# Patient Record
Sex: MALE | Race: OTHER | URBAN - METROPOLITAN AREA
[De-identification: names, ages, dates, MRNs, and addresses within clinical notes are randomized per-mention and may not be internally consistent; named-entity substitution may affect disease eponyms.]

---

## 2017-01-03 ENCOUNTER — IMPORTED ENCOUNTER (OUTPATIENT)
Dept: URBAN - METROPOLITAN AREA CLINIC 50 | Facility: CLINIC | Age: 77
End: 2017-01-03

## 2017-01-24 ENCOUNTER — IMPORTED ENCOUNTER (OUTPATIENT)
Dept: URBAN - METROPOLITAN AREA CLINIC 50 | Facility: CLINIC | Age: 77
End: 2017-01-24

## 2017-02-01 ENCOUNTER — IMPORTED ENCOUNTER (OUTPATIENT)
Dept: URBAN - METROPOLITAN AREA CLINIC 50 | Facility: CLINIC | Age: 77
End: 2017-02-01

## 2017-02-10 ENCOUNTER — IMPORTED ENCOUNTER (OUTPATIENT)
Dept: URBAN - METROPOLITAN AREA CLINIC 50 | Facility: CLINIC | Age: 77
End: 2017-02-10

## 2017-02-15 ENCOUNTER — IMPORTED ENCOUNTER (OUTPATIENT)
Dept: URBAN - METROPOLITAN AREA CLINIC 50 | Facility: CLINIC | Age: 77
End: 2017-02-15

## 2017-02-24 ENCOUNTER — IMPORTED ENCOUNTER (OUTPATIENT)
Dept: URBAN - METROPOLITAN AREA CLINIC 50 | Facility: CLINIC | Age: 77
End: 2017-02-24

## 2017-03-01 ENCOUNTER — IMPORTED ENCOUNTER (OUTPATIENT)
Dept: URBAN - METROPOLITAN AREA CLINIC 50 | Facility: CLINIC | Age: 77
End: 2017-03-01

## 2017-03-24 ENCOUNTER — IMPORTED ENCOUNTER (OUTPATIENT)
Dept: URBAN - METROPOLITAN AREA CLINIC 50 | Facility: CLINIC | Age: 77
End: 2017-03-24

## 2017-06-07 ENCOUNTER — IMPORTED ENCOUNTER (OUTPATIENT)
Dept: URBAN - METROPOLITAN AREA CLINIC 50 | Facility: CLINIC | Age: 77
End: 2017-06-07

## 2017-06-13 ENCOUNTER — IMPORTED ENCOUNTER (OUTPATIENT)
Dept: URBAN - METROPOLITAN AREA CLINIC 50 | Facility: CLINIC | Age: 77
End: 2017-06-13

## 2018-01-24 ENCOUNTER — IMPORTED ENCOUNTER (OUTPATIENT)
Dept: URBAN - METROPOLITAN AREA CLINIC 50 | Facility: CLINIC | Age: 78
End: 2018-01-24

## 2020-04-01 ENCOUNTER — IMPORTED ENCOUNTER (OUTPATIENT)
Dept: URBAN - METROPOLITAN AREA CLINIC 50 | Facility: CLINIC | Age: 80
End: 2020-04-01

## 2021-04-17 ASSESSMENT — TONOMETRY
OS_IOP_MMHG: 14
OD_IOP_MMHG: 28
OS_IOP_MMHG: 10
OS_IOP_MMHG: 18
OD_IOP_MMHG: 12
OD_IOP_MMHG: 13
OD_IOP_MMHG: 10
OS_IOP_MMHG: 14
OD_IOP_MMHG: 18
OD_IOP_MMHG: 14
OS_IOP_MMHG: 14
OS_IOP_MMHG: 13
OS_IOP_MMHG: 15
OD_IOP_MMHG: 14
OS_IOP_MMHG: 14
OD_IOP_MMHG: 16
OS_IOP_MMHG: 16
OD_IOP_MMHG: 13

## 2021-04-17 ASSESSMENT — VISUAL ACUITY
OS_CC: 20/30
OD_CC: J1+
OD_PH: 20/30-
OS_SC: 20/30
OD_BAT: 20/40
OD_SC: 20/40
OS_PH: 20/50
OD_OTHER: 20/50. 20/60.
OD_SC: 20/20
OS_OTHER: 20/60. 20/80.
OS_CC: J1+
OS_CC: 20/30-2
OS_SC: 20/40-
OS_CC: J1+@ 16 IN
OS_CC: 20/15
OD_SC: 20/25-
OD_BAT: 20/50
OS_PH: 20/30-
OD_BAT: 20/50
OS_BAT: 20/200
OS_OTHER: 20/40. 20/50.
OD_SC: 20/20
OS_BAT: 20/40
OD_CC: J1+@ 16 IN
OS_SC: 20/30-
OD_BAT: 20/30
OD_SC: 20/30-
OD_SC: 20/40-
OD_CC: 20/20
OS_BAT: 20/50
OD_OTHER: 20/30. 20/40.
OD_CC: 20/20-2
OS_SC: 20/40+2
OD_SC: 20/40
OD_PH: 20/50
OS_BAT: 20/60
OS_SC: 20/40
OD_OTHER: 20/40. 20/60.
OS_PH: 20/25
OS_OTHER: 20/200. 20/<400.
OS_SC: 20/25-1+2

## 2022-11-20 ENCOUNTER — NON-APPOINTMENT (OUTPATIENT)
Age: 82
End: 2022-11-20

## 2022-11-21 ENCOUNTER — TRANSCRIPTION ENCOUNTER (OUTPATIENT)
Age: 82
End: 2022-11-21

## 2022-11-23 ENCOUNTER — OUTPATIENT (OUTPATIENT)
Dept: OUTPATIENT SERVICES | Facility: HOSPITAL | Age: 82
LOS: 1 days | End: 2022-11-23

## 2022-11-23 ENCOUNTER — APPOINTMENT (OUTPATIENT)
Dept: DISASTER EMERGENCY | Facility: HOSPITAL | Age: 82
End: 2022-11-23

## 2022-11-23 VITALS
RESPIRATION RATE: 18 BRPM | OXYGEN SATURATION: 97 % | SYSTOLIC BLOOD PRESSURE: 133 MMHG | HEART RATE: 71 BPM | TEMPERATURE: 98 F | DIASTOLIC BLOOD PRESSURE: 74 MMHG

## 2022-11-23 VITALS
RESPIRATION RATE: 18 BRPM | OXYGEN SATURATION: 96 % | HEART RATE: 83 BPM | SYSTOLIC BLOOD PRESSURE: 144 MMHG | WEIGHT: 227.96 LBS | HEIGHT: 68 IN | TEMPERATURE: 98 F | DIASTOLIC BLOOD PRESSURE: 80 MMHG

## 2022-11-23 DIAGNOSIS — U07.1 COVID-19: ICD-10-CM

## 2022-11-23 RX ORDER — BEBTELOVIMAB 87.5 MG/ML
175 INJECTION, SOLUTION INTRAVENOUS ONCE
Refills: 0 | Status: COMPLETED | OUTPATIENT
Start: 2022-11-23 | End: 2022-11-23

## 2022-11-23 RX ADMIN — BEBTELOVIMAB 175 MILLIGRAM(S): 87.5 INJECTION, SOLUTION INTRAVENOUS at 08:47

## 2022-11-23 NOTE — MONOCLONAL ANTIBODY INFUSION - EXAM
CC: Monoclonal Antibody Infusion/COVID 19 Positive  82yMale    exam/findings:  T(C): 36.9 (11-23-22 @ 08:38), Max: 36.9 (11-23-22 @ 08:38)  HR: 83 (11-23-22 @ 08:38) (83 - 83)  BP: 144/80 (11-23-22 @ 08:38) (144/80 - 144/80)  RR: 18 (11-23-22 @ 08:38) (18 - 18)  SpO2: 96% (11-23-22 @ 08:38) (96% - 96%)      PE:   Appearance: NAD	  HEENT:   Normal oral mucosa,   Lymphatic: No lymphadenopathy  Cardiovascular: Normal S1 S2, No JVD, No murmurs, No edema  Respiratory: Lungs clear to auscultation	  Gastrointestinal:  Soft, Non-tender, + BS	  Skin: warm and dry  Neurologic: Non-focal  Extremities: Normal range of motion,

## 2022-11-23 NOTE — MONOCLONAL ANTIBODY INFUSION - ASSESSMENT AND PLAN
This is a 82 yr sold male with PMHx of HTN, HLD, coronary stents x 5 and recently diagnosed with Covid-19 infection who was referred for monoclonal antibody infusion by provider in urgent care after testing positive for COVID 19 on11/20/22.  Patient states he has been experiencing fever,  cough, sore throat, malaise, and nasal congestion since 11/20/22. He denies any CP, SOB, chills, numbness/tingling in b/l limbs, loss of sensation or motor function, N/V/D. Pt is vaccinated and boosted with pfizer.  PLAN:  - Injection procedure explained to patient   - Consent for monoclonal antibody injection obtained   - Risk & benefits discussed/all questions answered  - Inject Bebtelovimab 175 mg over 1 minute.  - Observe patient for one hour post administration    I have reviewed the Bebtelovimab Emergency Use Authorization (EUA) and I have provided the patient or patient's caregiver with the following information:    1. FDA has authorized emergency use Bebtelovimab, which is not an FDA-approved biological product.  2. The patient or patient's caregiver has the option to accept or refuse administration of Bebtelovimab.  3. The significant known and potential risks and benefits of Bebtelovimab and the extent to which such risks and benefits are unknown.  4. Information on available alternative treatments and risks and benefits of those alternatives.    The patient's COVID monoclonal antibody injection administration went well without any complications. The patient tolerated the treatment without any reactions. Vitals were stable throughout the injection & post-injection administration. The pt denies any CP, fevers, chills, SOB, numbness/tingling in b/l limbs, loss of sensation or motor function, N/V/D while receiving the injection. Patient denies any symptoms an hour after post injection. Vitals were taken post injection and were stable. Pt is medically stable to be discharged home. Discharge instructions were provided to the patient with a fact sheet included. Patient was instructed to self-isolate and use infection control measures (e.g wear mask, isolate, social distance, avoid sharing personal items, clean and disinfect "high touch" surfaces, and frequent handwashing according to the CDC guidelines. The patient was informed on what symptoms to be aware of for the next couple of days, and if there are any issues to call the 24/7 clinical call center. Patient was instructed to follow up with PCP as needed.

## 2023-08-02 ENCOUNTER — NON-APPOINTMENT (OUTPATIENT)
Age: 83
End: 2023-08-02

## 2023-11-21 ENCOUNTER — PREPPED CHART (OUTPATIENT)
Dept: URBAN - METROPOLITAN AREA CLINIC 49 | Facility: LOCATION | Age: 83
End: 2023-11-21

## 2023-12-07 ENCOUNTER — NEW PATIENT (OUTPATIENT)
Dept: URBAN - METROPOLITAN AREA CLINIC 49 | Facility: LOCATION | Age: 83
End: 2023-12-07

## 2023-12-07 ENCOUNTER — PREPPED CHART (OUTPATIENT)
Dept: URBAN - METROPOLITAN AREA CLINIC 49 | Facility: LOCATION | Age: 83
End: 2023-12-07

## 2023-12-07 VITALS — HEIGHT: 60 IN

## 2023-12-07 DIAGNOSIS — H35.373: ICD-10-CM

## 2023-12-07 DIAGNOSIS — H35.3131: ICD-10-CM

## 2023-12-07 DIAGNOSIS — H35.343: ICD-10-CM

## 2023-12-07 DIAGNOSIS — H26.493: ICD-10-CM

## 2023-12-07 DIAGNOSIS — H43.811: ICD-10-CM

## 2023-12-07 PROCEDURE — 92015 DETERMINE REFRACTIVE STATE: CPT

## 2023-12-07 PROCEDURE — 92134 CPTRZ OPH DX IMG PST SGM RTA: CPT

## 2023-12-07 PROCEDURE — 92004 COMPRE OPH EXAM NEW PT 1/>: CPT

## 2023-12-07 PROCEDURE — 66821 AFTER CATARACT LASER SURGERY: CPT

## 2023-12-07 ASSESSMENT — VISUAL ACUITY
OS_SC: 20/40
OD_PH: 20/60
OD_CC: 20/80-2
OS_CC: 20/40
OS_GLARE: 20/50
OS_GLARE: 20/40
OU_CC: J3-2
OD_SC: 20/60-2

## 2023-12-07 ASSESSMENT — TONOMETRY
OD_IOP_MMHG: 14
OS_IOP_MMHG: 15
OD_IOP_MMHG: 15

## 2024-02-08 ENCOUNTER — CLINIC PROCEDURE ONLY (OUTPATIENT)
Dept: URBAN - METROPOLITAN AREA CLINIC 49 | Facility: LOCATION | Age: 84
End: 2024-02-08

## 2024-02-08 DIAGNOSIS — H26.492: ICD-10-CM

## 2024-02-08 PROCEDURE — 6682150 YAG CAPSULOTOMY BILATERAL

## 2024-02-08 ASSESSMENT — VISUAL ACUITY
OD_CC: 20/70
OS_CC: 20/40

## 2024-02-08 ASSESSMENT — TONOMETRY
OD_IOP_MMHG: 15
OS_IOP_MMHG: 15
OD_IOP_MMHG: 08

## 2024-03-05 ENCOUNTER — POST-OP (OUTPATIENT)
Dept: URBAN - METROPOLITAN AREA CLINIC 53 | Facility: CLINIC | Age: 84
End: 2024-03-05

## 2024-03-05 DIAGNOSIS — Z98.890: ICD-10-CM

## 2024-03-05 DIAGNOSIS — H35.343: ICD-10-CM

## 2024-03-05 DIAGNOSIS — H26.491: ICD-10-CM

## 2024-03-05 DIAGNOSIS — H43.811: ICD-10-CM

## 2024-03-05 DIAGNOSIS — H35.373: ICD-10-CM

## 2024-03-05 DIAGNOSIS — H35.3131: ICD-10-CM

## 2024-03-05 PROCEDURE — 66821 AFTER CATARACT LASER SURGERY: CPT

## 2024-03-05 PROCEDURE — 92134 CPTRZ OPH DX IMG PST SGM RTA: CPT

## 2024-03-05 ASSESSMENT — VISUAL ACUITY
OU_CC: J7
OD_CC: 20/80-1
OS_CC: 20/80
OS_PH: 20/50
OU_CC: 20/60

## 2024-03-05 ASSESSMENT — TONOMETRY
OD_IOP_MMHG: 15
OD_IOP_MMHG: 14
OS_IOP_MMHG: 12

## 2024-03-19 ENCOUNTER — POST-OP (OUTPATIENT)
Dept: URBAN - METROPOLITAN AREA CLINIC 53 | Facility: CLINIC | Age: 84
End: 2024-03-19

## 2024-03-19 DIAGNOSIS — Z98.890: ICD-10-CM

## 2024-03-19 DIAGNOSIS — H35.343: ICD-10-CM

## 2024-03-19 DIAGNOSIS — H35.373: ICD-10-CM

## 2024-03-19 DIAGNOSIS — H35.3131: ICD-10-CM

## 2024-03-19 PROCEDURE — 92015 DETERMINE REFRACTIVE STATE: CPT

## 2024-03-19 ASSESSMENT — VISUAL ACUITY
OS_SC: 20/60
OU_SC: J5@16"
OS_PH: 20/50
OU_SC: 20/60
OD_SC: 20/70

## 2024-03-19 ASSESSMENT — TONOMETRY
OD_IOP_MMHG: 14
OS_IOP_MMHG: 12

## 2025-01-15 PROBLEM — Z00.00 ENCOUNTER FOR PREVENTIVE HEALTH EXAMINATION: Status: ACTIVE | Noted: 2025-01-15

## 2025-01-21 ENCOUNTER — INPATIENT (INPATIENT)
Facility: HOSPITAL | Age: 85
LOS: 2 days | Discharge: HOME | DRG: 293 | End: 2025-01-24
Attending: STUDENT IN AN ORGANIZED HEALTH CARE EDUCATION/TRAINING PROGRAM | Admitting: HOSPITALIST
Payer: MEDICARE

## 2025-01-21 ENCOUNTER — RESULT REVIEW (OUTPATIENT)
Age: 85
End: 2025-01-21

## 2025-01-21 VITALS
RESPIRATION RATE: 22 BRPM | HEART RATE: 92 BPM | HEIGHT: 70 IN | WEIGHT: 235.89 LBS | OXYGEN SATURATION: 88 % | TEMPERATURE: 98 F | DIASTOLIC BLOOD PRESSURE: 76 MMHG | SYSTOLIC BLOOD PRESSURE: 126 MMHG

## 2025-01-21 DIAGNOSIS — I50.9 HEART FAILURE, UNSPECIFIED: ICD-10-CM

## 2025-01-21 DIAGNOSIS — Z98.890 OTHER SPECIFIED POSTPROCEDURAL STATES: Chronic | ICD-10-CM

## 2025-01-21 DIAGNOSIS — I50.21 ACUTE SYSTOLIC (CONGESTIVE) HEART FAILURE: ICD-10-CM

## 2025-01-21 LAB
ALBUMIN SERPL ELPH-MCNC: 3.5 G/DL — SIGNIFICANT CHANGE UP (ref 3.3–5.2)
ALP SERPL-CCNC: 59 U/L — SIGNIFICANT CHANGE UP (ref 40–120)
ALT FLD-CCNC: 15 U/L — SIGNIFICANT CHANGE UP
ANION GAP SERPL CALC-SCNC: 13 MMOL/L — SIGNIFICANT CHANGE UP (ref 5–17)
ANISOCYTOSIS BLD QL: SLIGHT — SIGNIFICANT CHANGE UP
APTT BLD: 36.3 SEC — HIGH (ref 24.5–35.6)
AST SERPL-CCNC: 17 U/L — SIGNIFICANT CHANGE UP
BASOPHILS # BLD AUTO: 0 K/UL — SIGNIFICANT CHANGE UP (ref 0–0.2)
BASOPHILS NFR BLD AUTO: 0 % — SIGNIFICANT CHANGE UP (ref 0–2)
BILIRUB SERPL-MCNC: 0.7 MG/DL — SIGNIFICANT CHANGE UP (ref 0.4–2)
BUN SERPL-MCNC: 27.2 MG/DL — HIGH (ref 8–20)
CALCIUM SERPL-MCNC: 8.3 MG/DL — LOW (ref 8.4–10.5)
CHLORIDE SERPL-SCNC: 107 MMOL/L — SIGNIFICANT CHANGE UP (ref 96–108)
CO2 SERPL-SCNC: 22 MMOL/L — SIGNIFICANT CHANGE UP (ref 22–29)
CREAT SERPL-MCNC: 0.9 MG/DL — SIGNIFICANT CHANGE UP (ref 0.5–1.3)
EGFR: 85 ML/MIN/1.73M2 — SIGNIFICANT CHANGE UP
EOSINOPHIL # BLD AUTO: 0.06 K/UL — SIGNIFICANT CHANGE UP (ref 0–0.5)
EOSINOPHIL NFR BLD AUTO: 0.9 % — SIGNIFICANT CHANGE UP (ref 0–6)
FLUAV AG NPH QL: SIGNIFICANT CHANGE UP
FLUBV AG NPH QL: SIGNIFICANT CHANGE UP
GLUCOSE SERPL-MCNC: 117 MG/DL — HIGH (ref 70–99)
HCT VFR BLD CALC: 37.2 % — LOW (ref 39–50)
HGB BLD-MCNC: 12.1 G/DL — LOW (ref 13–17)
INR BLD: 1.64 RATIO — HIGH (ref 0.85–1.16)
LYMPHOCYTES # BLD AUTO: 0.32 K/UL — LOW (ref 1–3.3)
LYMPHOCYTES # BLD AUTO: 5.2 % — LOW (ref 13–44)
MANUAL SMEAR VERIFICATION: SIGNIFICANT CHANGE UP
MCHC RBC-ENTMCNC: 30.5 PG — SIGNIFICANT CHANGE UP (ref 27–34)
MCHC RBC-ENTMCNC: 32.5 G/DL — SIGNIFICANT CHANGE UP (ref 32–36)
MCV RBC AUTO: 93.7 FL — SIGNIFICANT CHANGE UP (ref 80–100)
MICROCYTES BLD QL: SLIGHT — SIGNIFICANT CHANGE UP
MONOCYTES # BLD AUTO: 0.54 K/UL — SIGNIFICANT CHANGE UP (ref 0–0.9)
MONOCYTES NFR BLD AUTO: 8.7 % — SIGNIFICANT CHANGE UP (ref 2–14)
NEUTROPHILS # BLD AUTO: 5.04 K/UL — SIGNIFICANT CHANGE UP (ref 1.8–7.4)
NEUTROPHILS NFR BLD AUTO: 80.8 % — HIGH (ref 43–77)
NEUTS BAND # BLD: 0.9 % — SIGNIFICANT CHANGE UP (ref 0–8)
NEUTS BAND NFR BLD: 0.9 % — SIGNIFICANT CHANGE UP (ref 0–8)
NT-PROBNP SERPL-SCNC: 2306 PG/ML — HIGH (ref 0–300)
OVALOCYTES BLD QL SMEAR: SLIGHT — SIGNIFICANT CHANGE UP
PLAT MORPH BLD: NORMAL — SIGNIFICANT CHANGE UP
PLATELET # BLD AUTO: SIGNIFICANT CHANGE UP K/UL (ref 150–400)
POIKILOCYTOSIS BLD QL AUTO: SLIGHT — SIGNIFICANT CHANGE UP
POLYCHROMASIA BLD QL SMEAR: SLIGHT — SIGNIFICANT CHANGE UP
POTASSIUM SERPL-MCNC: 3.9 MMOL/L — SIGNIFICANT CHANGE UP (ref 3.5–5.3)
POTASSIUM SERPL-SCNC: 3.9 MMOL/L — SIGNIFICANT CHANGE UP (ref 3.5–5.3)
PROT SERPL-MCNC: 6.3 G/DL — LOW (ref 6.6–8.7)
PROTHROM AB SERPL-ACNC: 18.9 SEC — HIGH (ref 9.9–13.4)
RBC # BLD: 3.97 M/UL — LOW (ref 4.2–5.8)
RBC # FLD: 15.6 % — HIGH (ref 10.3–14.5)
RBC BLD AUTO: ABNORMAL
RSV RNA NPH QL NAA+NON-PROBE: SIGNIFICANT CHANGE UP
SARS-COV-2 RNA SPEC QL NAA+PROBE: SIGNIFICANT CHANGE UP
SODIUM SERPL-SCNC: 142 MMOL/L — SIGNIFICANT CHANGE UP (ref 135–145)
TROPONIN T, HIGH SENSITIVITY RESULT: 41 NG/L — SIGNIFICANT CHANGE UP (ref 0–51)
VARIANT LYMPHS # BLD: 3.5 % — SIGNIFICANT CHANGE UP (ref 0–6)
VARIANT LYMPHS NFR BLD MANUAL: 3.5 % — SIGNIFICANT CHANGE UP (ref 0–6)
WBC # BLD: 6.17 K/UL — SIGNIFICANT CHANGE UP (ref 3.8–10.5)
WBC # FLD AUTO: 6.17 K/UL — SIGNIFICANT CHANGE UP (ref 3.8–10.5)

## 2025-01-21 PROCEDURE — 71045 X-RAY EXAM CHEST 1 VIEW: CPT | Mod: 26

## 2025-01-21 PROCEDURE — 71275 CT ANGIOGRAPHY CHEST: CPT | Mod: 26

## 2025-01-21 PROCEDURE — 99223 1ST HOSP IP/OBS HIGH 75: CPT

## 2025-01-21 PROCEDURE — 93306 TTE W/DOPPLER COMPLETE: CPT | Mod: 26

## 2025-01-21 PROCEDURE — 93970 EXTREMITY STUDY: CPT | Mod: 26

## 2025-01-21 PROCEDURE — 99285 EMERGENCY DEPT VISIT HI MDM: CPT | Mod: GC

## 2025-01-21 PROCEDURE — 99223 1ST HOSP IP/OBS HIGH 75: CPT | Mod: 25

## 2025-01-21 RX ORDER — ATORVASTATIN CALCIUM 80 MG/1
1 TABLET, FILM COATED ORAL
Refills: 0 | DISCHARGE

## 2025-01-21 RX ORDER — ENOXAPARIN SODIUM 100 MG/ML
100 INJECTION SUBCUTANEOUS EVERY 12 HOURS
Refills: 0 | Status: DISCONTINUED | OUTPATIENT
Start: 2025-01-21 | End: 2025-01-22

## 2025-01-21 RX ORDER — CARVEDILOL 6.25 MG
1 TABLET ORAL
Refills: 0 | DISCHARGE

## 2025-01-21 RX ORDER — ZOLPIDEM TARTRATE 5 MG/1
5 TABLET, COATED ORAL AT BEDTIME
Refills: 0 | Status: DISCONTINUED | OUTPATIENT
Start: 2025-01-21 | End: 2025-01-24

## 2025-01-21 RX ORDER — LEUPROLIDE ACETATE 1 MG/0.2ML
45 KIT SUBCUTANEOUS
Refills: 0 | DISCHARGE

## 2025-01-21 RX ORDER — CARVEDILOL 6.25 MG
25 TABLET ORAL EVERY 12 HOURS
Refills: 0 | Status: DISCONTINUED | OUTPATIENT
Start: 2025-01-21 | End: 2025-01-24

## 2025-01-21 RX ORDER — ZOLPIDEM TARTRATE 5 MG/1
1 TABLET, COATED ORAL
Refills: 0 | DISCHARGE

## 2025-01-21 RX ORDER — VALSARTAN 80 MG
1 TABLET ORAL
Refills: 0 | DISCHARGE

## 2025-01-21 RX ORDER — AMIODARONE HYDROCHLORIDE 50 MG/ML
200 INJECTION, SOLUTION INTRAVENOUS DAILY
Refills: 0 | Status: DISCONTINUED | OUTPATIENT
Start: 2025-01-21 | End: 2025-01-24

## 2025-01-21 RX ORDER — VALSARTAN 80 MG
320 TABLET ORAL DAILY
Refills: 0 | Status: DISCONTINUED | OUTPATIENT
Start: 2025-01-21 | End: 2025-01-24

## 2025-01-21 RX ORDER — HYDRALAZINE HCL 100 MG
50 TABLET ORAL
Refills: 0 | Status: DISCONTINUED | OUTPATIENT
Start: 2025-01-21 | End: 2025-01-24

## 2025-01-21 RX ORDER — IRON/FOLIC ACID/C/B6/B12/ZINC 150-1.25MG
1 TABLET ORAL
Refills: 0 | DISCHARGE

## 2025-01-21 RX ORDER — ASPIRIN 81 MG/1
0 TABLET, COATED ORAL
Refills: 0 | DISCHARGE

## 2025-01-21 RX ORDER — ASPIRIN 81 MG/1
81 TABLET, COATED ORAL DAILY
Refills: 0 | Status: DISCONTINUED | OUTPATIENT
Start: 2025-01-21 | End: 2025-01-24

## 2025-01-21 RX ORDER — AMIODARONE HYDROCHLORIDE 50 MG/ML
1 INJECTION, SOLUTION INTRAVENOUS
Refills: 0 | DISCHARGE

## 2025-01-21 RX ORDER — ATORVASTATIN CALCIUM 80 MG/1
40 TABLET, FILM COATED ORAL AT BEDTIME
Refills: 0 | Status: DISCONTINUED | OUTPATIENT
Start: 2025-01-21 | End: 2025-01-24

## 2025-01-21 RX ORDER — HYDRALAZINE HCL 100 MG
1 TABLET ORAL
Refills: 0 | DISCHARGE

## 2025-01-21 RX ADMIN — Medication 40 MILLIGRAM(S): at 13:57

## 2025-01-21 RX ADMIN — Medication 50 MILLIGRAM(S): at 20:40

## 2025-01-21 RX ADMIN — Medication 40 MILLIGRAM(S): at 20:40

## 2025-01-21 RX ADMIN — ENOXAPARIN SODIUM 100 MILLIGRAM(S): 100 INJECTION SUBCUTANEOUS at 20:40

## 2025-01-21 RX ADMIN — ATORVASTATIN CALCIUM 40 MILLIGRAM(S): 80 TABLET, FILM COATED ORAL at 22:00

## 2025-01-21 RX ADMIN — Medication 25 MILLIGRAM(S): at 20:40

## 2025-01-21 NOTE — CONSULT NOTE ADULT - PROBLEM SELECTOR RECOMMENDATION 9
- Pt w/ history of CAD s/p stents, severe MR s/p mitral clip, new onset Afib recently failed cardioversion now on amio/eliquis and developed heart failure symptoms at home  - will call and get records faxed from Dr. Boo's office in [place], according to cas pt had normal EF in december   - now pt w/ JVD, leg edema bilaterally, pulmonary edema, and bilateral pleural effusions  - CCTA negative for PE  - check echo   - switch eliquis to heparin infusion full AC protocol   - start lasix 40mg IV BID  - goal urine output is 1-2L net negative in 24h   - we will give GDMT based upon outpatient records and echo results   - admit to hospitalist  - potential ischemic evaluation  - we will follow

## 2025-01-21 NOTE — CONSULT NOTE ADULT - SUBJECTIVE AND OBJECTIVE BOX
[facility]                                              CARDIOLOGY AT [address], [place] [address]                                             Telephone: [phone]. Fax:[phone]                                                       CARDIOLOGY CONSULTATION NOTE                                                                                             History obtained by: Patient and medical record   Community Cardiologist: Dr. Alexandre Boo ([facility])    obtained: Yes [  ] No [  ]  Available out pt records reviewed: Yes [  ] No [  ]    Chief complaint:    Patient is a 84y old  Male who presents with a chief complaint of heart failure     HPI:  85 y/o Male w/ PMH of HTN, HLF, HF, Afib (on Eliquis) presenting w/ SOB for 1 week. Reports SOB, worse w/ exertion, LE edema, and palpitations. Endorses feeling lightheaded. States that he had a cardioversion in [place] on 1/13, sxs have not improved. Denies fever, chills, N/V/D, CP.    Review of symptoms:   Cardiac:  No chest pain. No dyspnea. No palpitations.  Respiratory: no cough. No dyspnea  Gastrointestinal: No diarrhea. No abdominal pain. No bleeding.   Neuro: No focal neuro complaints.  All other ROS negative unless otherwise listed above    PHYSICAL EXAM:  Appearance: Comfortable. No acute distress  HEENT:  Atraumatic. Normocephalic.  Normal oral mucosa  Neurologic: A & O x 3, no gross focal deficits.  Cardiovascular: RRR S1 S2, No murmur, no rubs/gallops. No JVD  Respiratory: Lungs clear to auscultation, unlabored   Gastrointestinal:  Soft, Non-tender, + BS  Lower Extremities: 2+ Peripheral Pulses, No clubbing, cyanosis, or edema  Psychiatry: Patient is calm. No agitation.   Skin: warm and dry.    PAST MEDICAL HISTORY  Atrial fibrillation    PAST SURGICAL HISTORY    SUBSTANCE USE HISTORY  Denies current and previous substance use [  ]   CIGARETTES -   ALCOHOL -   DRUGS -     FAMILY HISTORY:    CARDIAC SPECIFIC FAMILY HX   No KNOWN family history of Cardiovascular disease, CAD, or sudden death in first degree relatives unless specified below  Family History of Cardiovascular Disease:  [  ]   Coronary Artery Disease in first degree relative:  [  ]   Sudden Cardiac Death in First degree relative: [  ]    HOME MEDICATIONS:  amiodarone 200 mg oral tablet: 1 tab(s) orally once a day (21 Jan 2025 11:27)  amLODIPine 5 mg oral tablet: 1 tab(s) orally once a day (21 Jan 2025 11:27)  apixaban 5 mg oral tablet: 1 tab(s) orally once a day (21 Jan 2025 11:27)  aspirin 81 mg oral tablet: orally once a day (21 Jan 2025 11:27)  atorvastatin 40 mg oral tablet: 1 tab(s) orally once a day (21 Jan 2025 11:27)  carvedilol 25 mg oral tablet: 1 tab(s) orally 2 times a day (21 Jan 2025 11:28)  Eligard 45 mg/6 months subcutaneous injection, extended release: 45 milligram(s) subcutaneously every 3 months (21 Jan 2025 11:28)  finasteride 5 mg oral tablet: 1 tab(s) orally once a day (21 Jan 2025 11:28)  furosemide 40 mg oral tablet: 1 tab(s) orally once a day (21 Jan 2025 11:28)  hydrALAZINE 50 mg oral tablet: 1 tab(s) orally 2 times a day (21 Jan 2025 11:28)  PreserVision AREDS 2 oral capsule: 1 tab(s) orally 2 times a day (21 Jan 2025 11:28)  valsartan 320 mg oral tablet: 1 tab(s) orally once a day (21 Jan 2025 11:28)  zolpidem 5 mg oral tablet: 1 tab(s) orally once a day (at bedtime) (21 Jan 2025 11:28)    CURRENT CARDIAC MEDICATIONS:    CURRENT OTHER MEDICATIONS:    ALLERGIES:   No Known Allergies    VITAL SIGNS:  T(C): 36.7 (01-21-25 @ 09:56), Max: 36.7 (01-21-25 @ 09:56)  T(F): 98 (01-21-25 @ 09:56), Max: 98 (01-21-25 @ 09:56)  HR: 92 (01-21-25 @ 09:56) (92 - 92)  BP: 126/76 (01-21-25 @ 09:56) (126/76 - 126/76)  RR: 22 (01-21-25 @ 09:56) (22 - 22)  SpO2: 88% (01-21-25 @ 09:56) (88% - 88%)    INTAKE AND OUTPUT:     LABS:             12.1   6.17  )-----------( Clumped    ( 21 Jan 2025 11:20 )             37.2     01-21    142  |  107  |  27.2[H]  ----------------------------<  117[H]  3.9   |  22.0  |  0.90    Ca    8.3[L]      21 Jan 2025 11:20    TPro  6.3[L]  /  Alb  3.5  /  TBili  0.7  /  DBili  x   /  AST  17  /  ALT  15  /  AlkPhos  59  01-21    PT/INR - ( 21 Jan 2025 11:20 )   PT: 18.9 sec;   INR: 1.64 ratio         PTT - ( 21 Jan 2025 11:20 )  PTT:36.3 sec  Urinalysis Basic - ( 21 Jan 2025 11:20 )    Color: x / Appearance: x / SG: x / pH: x  Gluc: 117 mg/dL / Ketone: x  / Bili: x / Urobili: x   Blood: x / Protein: x / Nitrite: x   Leuk Esterase: x / RBC: x / WBC x   Sq Epi: x / Non Sq Epi: x / Bacteria: x    RADIOLOGY IMAGING:   CT Angio Chest PE Protocol w/ IV Cont: Urgent   Indication: SOB  Transport: Stretcher-Crib  Exam Completed (01-21-25 @ 11:05) [Results Available]  Xray Chest 1 View- PORTABLE-Urgent: Urgent   Indication: SOB  Transport: Portable  Exam Completed (01-21-25 @ 11:04) [Performed]  12 Lead ECG:   Provider's Contact #: [phone] (01-21-25 @ 10:01) [Completed]

## 2025-01-21 NOTE — H&P ADULT - NSICDXPASTMEDICALHX_GEN_ALL_CORE_FT
PAST MEDICAL HISTORY:  Atrial fibrillation     BPH without urinary obstruction     CAD (coronary artery disease)     HTN (hypertension)     Mitral regurgitation

## 2025-01-21 NOTE — H&P ADULT - TIME BILLING
Discussion with Emergency Department physician, Review of patient current and prior charts (including personal review of labs, imaging, EKG), Review on medications on Dr First, Patient interview and examination, discussion of current problems and plans, medication reconciliation, order placement, discussion with specialists and documentation of entire process.

## 2025-01-21 NOTE — ED PROVIDER NOTE - CARE PLAN
1 Principal Discharge DX:	Acute exacerbation of CHF (congestive heart failure)   Principal Discharge DX:	Acute exacerbation of CHF (congestive heart failure)  Secondary Diagnosis:	Acute hypoxic respiratory failure

## 2025-01-21 NOTE — ED ADULT NURSE NOTE - NSFALLHARMRISKINTERV_ED_ALL_ED
Communicate risk of Fall with Harm to all staff, patient, and family/Provide visual cue: red socks, yellow wristband, yellow gown, etc/Reinforce activity limits and safety measures with patient and family/Bed in lowest position, wheels locked, appropriate side rails in place/Call bell, personal items and telephone in reach/Instruct patient to call for assistance before getting out of bed/chair/stretcher/Non-slip footwear applied when patient is off stretcher/[place] to call system/Physically safe environment - no spills, clutter or unnecessary equipment/Purposeful Proactive Rounding/Room/bathroom lighting operational, light cord in reach Assistance OOB with selected safe patient handling equipment if applicable/Assistance with ambulation/Communicate risk of Fall with Harm to all staff, patient, and family/Monitor gait and stability/Provide patient with walking aids/Provide visual cue: red socks, yellow wristband, yellow gown, etc/Reinforce activity limits and safety measures with patient and family/Toileting schedule using arm’s reach rule for commode and bathroom/Bed in lowest position, wheels locked, appropriate side rails in place/Call bell, personal items and telephone in reach/Instruct patient to call for assistance before getting out of bed/chair/stretcher/Non-slip footwear applied when patient is off stretcher/[place] to call system/Physically safe environment - no spills, clutter or unnecessary equipment/Purposeful Proactive Rounding/Room/bathroom lighting operational, light cord in reach

## 2025-01-21 NOTE — H&P ADULT - NSHPPHYSICALEXAM_GEN_ALL_CORE
OBJECTIVE:  Vital Signs Last 24 Hrs  T(C): 36.2 (21 Jan 2025 15:44), Max: 36.7 (21 Jan 2025 09:56)  T(F): 97.2 (21 Jan 2025 15:44), Max: 98 (21 Jan 2025 09:56)  HR: 86 (21 Jan 2025 15:44) (86 - 92)  BP: 122/74 (21 Jan 2025 15:44) (122/74 - 126/76)  BP(mean): --  RR: 20 (21 Jan 2025 15:44) (20 - 22)  SpO2: 96% (21 Jan 2025 15:44) (88% - 96%)    Parameters below as of 21 Jan 2025 15:44  Patient On (Oxygen Delivery Method): nasal cannula        PHYSICAL EXAMINATION  General: Elderly male sitting up on stretcher, comfortable  HEENT:  2LNC  NECK:  Supple  CVS: JVD(+), Irregularly Irregular S1 S2, Murmur, Edema (+)  RESP:  Decrease breath sounds at bases  GI:  Soft nondistended nontender BS+  : No suprapubic tenderness  MSK:  Moves all extremities. Bilateral LE edema  CNS:  Awake, oriented, intact cognition and fluent speech  INTEG:  Warm skin  PSYCH:  Fair mood

## 2025-01-21 NOTE — H&P ADULT - NSHPLABSRESULTS_GEN_ALL_CORE
EKG - personally reviewed AF 85 bpm  CTA - Bilateral effusion, pulmonary edema, compressive atelectasis

## 2025-01-21 NOTE — PATIENT PROFILE ADULT - FALL HARM RISK - RISK INTERVENTIONS

## 2025-01-21 NOTE — ED PROVIDER NOTE - PHYSICAL EXAMINATION
General: Alert, awake, NAD  HEENT: NCAT. PERRLA  Neck: Supple, no JVD  Pulm: CTAB. No wheezing, rales, or rhonchi  Cardiac: RRR. No MRG  Abdomen: Soft, Nontender and nondistended. No rebound or guarding  Skin: Warm, dry, no rashes or lesions  Neuro: AAOx3, no gross focal deficits  MSK: No deformities, normal tone. HAN spontaneously  Extremities: No clubbing or cyanosis. 2+ pitting edema B/L. No calf tenderness B/L

## 2025-01-21 NOTE — ED PROVIDER NOTE - ATTENDING CONTRIBUTION TO CARE
pleasant adult male with increased SOB and edema s/p failed outpt DCCV for afib; on exam, rate controlled afib; warm and well perfused; mildly dyspneic; diminished BS b/l based; abd soft nt nd; +b/l pedal edema, pitting; labs and iamging reviewed c/w acute chf, pleural effusions; requiring O2 to maintain sats >92%; cards consult appreciated; plan for admission for management of chf    I personally saw the patient with the resident, and completed the key components of the history and physical exam. I then discussed the management plan with the resident.

## 2025-01-21 NOTE — H&P ADULT - HISTORY OF PRESENT ILLNESS
84 year old male with Hypertension, CAD s/p PCI, Atrial Fibrillation with recent unsuccessful cardioversion (Jan 13 in [place]), BPH, Mitral regurgitation s/p Clip and heart failure presents with dyspnea which has been ongoing for the past couple of weeks, worse on exertion and associated with chest tightness, palpitations, dizziness and intermittent pedal edema. Progressively worsening so much that he couldn't walk so came to ER.

## 2025-01-21 NOTE — ED PROVIDER NOTE - CLINICAL SUMMARY MEDICAL DECISION MAKING FREE TEXT BOX
85 y/o Male w/ PMH of HTN, HF, HLD, and A fib on Eliquis presenting with SOB for 1 week.    BNP 2306. CTA chest negative for PE, but notable for pulmonary edema and moderate pleural effusions. 40mg of Lasix IV given. Cardiology consulted.    Admit to medicine for further management.

## 2025-01-21 NOTE — ED ADULT TRIAGE NOTE - CHIEF COMPLAINT QUOTE
Pt had a cardioversion done for Afib in [place] on 1/13/24. Symptoms have not improved. Pt c/o SOB, dyspnea on exertion, lower extremity swelling and intermittent palpitations. Pt is on Eliquis and Metoprolol

## 2025-01-21 NOTE — H&P ADULT - ASSESSMENT
84 year old male with Hypertension, CAD s/p PCI, Atrial Fibrillation with recent unsuccessful cardioversion (Jan 13 in [place]), BPH, Mitral regurgitation s/p Clip and heart failure presents with dyspnea which has been ongoing for the past couple of weeks, worse on exertion and associated with chest tightness, palpitations, dizziness and intermittent pedal edema. Progressively worsening so much that he couldn't walk so came to ER.  Hypoxic in ER requiring supplemental O2 with EKG showing rate controlled Atrial Fibrillation CTA chest with pulmonary edema, bilateral effusions and compressive atelectasis      # Acute Heart Failure, Systolic + Diastolic  # Valvular Heart Disease  # Acute Hypoxic Respiratory Failure  # Atelectasis  # Atria Fibrillation  # History of CAD s/p PCI  # Hypertension    - Admit to telemetry  - Supplemental O2, wean as tolerated  - Diuresis with Furosemide 40mg IVq12; Fluid  and salt restriction, daily weight and close monitoring of fluid balance  - Resume home Valsartan, Carvedilol, Hydralazine for CHF  - Amiodarone for rate control  - Transition Apixaban to LMWH for now pending possible procedures  - ASA, Statin  - Check TTE  - Cardiology evaluation appreciated    Disposition - Pending clinical course;     Discussed with patient and daughter at bedside

## 2025-01-21 NOTE — ED PROVIDER NOTE - OBJECTIVE STATEMENT
85 y/o Male w/ PMH of Afib (on Eliquis) presenting w/ SOB for 1 week. Reports SOB, worse w/ exertion, LE edema, and palpitations. Endorses feeling lightheaded. States that he had a cardioversion in [place] on 1/13, sxs have not improved. Denies fever, chills, N/V/D, CP. 83 y/o Male w/ PMH of HTN, HLF, HF, Afib (on Eliquis) presenting w/ SOB for 1 week. Reports SOB, worse w/ exertion, LE edema, and palpitations. Endorses feeling lightheaded. States that he had a cardioversion in [place] on 1/13, sxs have not improved. Denies fever, chills, N/V/D, CP. 85 y/o Male w/ PMH of HTN, HLD, HF, Afib (on Eliquis) presenting w/ SOB for 1 week. Reports SOB, worse w/ exertion, LE edema, and palpitations. Endorses feeling lightheaded. States that he had a cardioversion in [place] on 1/13, sxs have not improved. Denies fever, chills, N/V/D, CP.

## 2025-01-21 NOTE — ED ADULT NURSE NOTE - OBJECTIVE STATEMENT
Received pt AOx4 with c/o SOB, palpitations x2days. Hx afib. +elaquis. Denies chest pain at this moment. On 4LNC. Denies Received pt AOx4 with c/o SOB, palpitations x2days. Hx afib. +elaquis. Denies chest pain at this moment. On 4LNC. Denies o2 use at home. Respirations even & unlabored. On cardiac monitor with no distress.  Safety maintained.

## 2025-01-21 NOTE — CONSULT NOTE ADULT - ASSESSMENT
83 y/o Male w/ PMH of HTN, HLF, HF, Afib (on Eliquis) presenting w/ SOB for 1 week. Reports SOB, worse w/ exertion, LE edema, and palpitations. Endorses feeling lightheaded. States that he had a cardioversion in [place] on 1/13, sxs have not improved. Denies fever, chills, N/V/D, CP.

## 2025-01-22 DIAGNOSIS — I50.33 ACUTE ON CHRONIC DIASTOLIC (CONGESTIVE) HEART FAILURE: ICD-10-CM

## 2025-01-22 DIAGNOSIS — I48.91 UNSPECIFIED ATRIAL FIBRILLATION: ICD-10-CM

## 2025-01-22 LAB
ANION GAP SERPL CALC-SCNC: 14 MMOL/L — SIGNIFICANT CHANGE UP (ref 5–17)
BUN SERPL-MCNC: 32.9 MG/DL — HIGH (ref 8–20)
CALCIUM SERPL-MCNC: 8.7 MG/DL — SIGNIFICANT CHANGE UP (ref 8.4–10.5)
CHLORIDE SERPL-SCNC: 103 MMOL/L — SIGNIFICANT CHANGE UP (ref 96–108)
CO2 SERPL-SCNC: 26 MMOL/L — SIGNIFICANT CHANGE UP (ref 22–29)
CREAT SERPL-MCNC: 1.25 MG/DL — SIGNIFICANT CHANGE UP (ref 0.5–1.3)
CRP SERPL-MCNC: 4 MG/L — SIGNIFICANT CHANGE UP
EGFR: 57 ML/MIN/1.73M2 — LOW
GLUCOSE SERPL-MCNC: 131 MG/DL — HIGH (ref 70–99)
POTASSIUM SERPL-MCNC: 3.3 MMOL/L — LOW (ref 3.5–5.3)
POTASSIUM SERPL-SCNC: 3.3 MMOL/L — LOW (ref 3.5–5.3)
SODIUM SERPL-SCNC: 143 MMOL/L — SIGNIFICANT CHANGE UP (ref 135–145)
TROPONIN T, HIGH SENSITIVITY RESULT: 60 NG/L — HIGH (ref 0–51)

## 2025-01-22 PROCEDURE — 99233 SBSQ HOSP IP/OBS HIGH 50: CPT | Mod: 25

## 2025-01-22 PROCEDURE — 99232 SBSQ HOSP IP/OBS MODERATE 35: CPT

## 2025-01-22 PROCEDURE — 99233 SBSQ HOSP IP/OBS HIGH 50: CPT

## 2025-01-22 RX ORDER — COLCHICINE 0.6 MG/1
0.6 TABLET ORAL
Refills: 0 | Status: DISCONTINUED | OUTPATIENT
Start: 2025-01-22 | End: 2025-01-24

## 2025-01-22 RX ORDER — POTASSIUM CHLORIDE 750 MG/1
40 TABLET, EXTENDED RELEASE ORAL ONCE
Refills: 0 | Status: COMPLETED | OUTPATIENT
Start: 2025-01-22 | End: 2025-01-22

## 2025-01-22 RX ORDER — APIXABAN 5 MG/1
5 TABLET, FILM COATED ORAL EVERY 12 HOURS
Refills: 0 | Status: DISCONTINUED | OUTPATIENT
Start: 2025-01-22 | End: 2025-01-24

## 2025-01-22 RX ORDER — COLCHICINE 0.6 MG/1
0.6 TABLET ORAL THREE TIMES A DAY
Refills: 0 | Status: DISCONTINUED | OUTPATIENT
Start: 2025-01-22 | End: 2025-01-22

## 2025-01-22 RX ORDER — MIDODRINE HYDROCHLORIDE 5 MG/1
10 TABLET ORAL ONCE
Refills: 0 | Status: COMPLETED | OUTPATIENT
Start: 2025-01-22 | End: 2025-01-22

## 2025-01-22 RX ADMIN — POTASSIUM CHLORIDE 40 MILLIEQUIVALENT(S): 750 TABLET, EXTENDED RELEASE ORAL at 13:20

## 2025-01-22 RX ADMIN — POTASSIUM CHLORIDE 40 MILLIEQUIVALENT(S): 750 TABLET, EXTENDED RELEASE ORAL at 13:02

## 2025-01-22 RX ADMIN — Medication 50 MILLIGRAM(S): at 05:52

## 2025-01-22 RX ADMIN — COLCHICINE 0.6 MILLIGRAM(S): 0.6 TABLET ORAL at 21:42

## 2025-01-22 RX ADMIN — Medication 25 MILLIGRAM(S): at 05:52

## 2025-01-22 RX ADMIN — COLCHICINE 0.6 MILLIGRAM(S): 0.6 TABLET ORAL at 15:13

## 2025-01-22 RX ADMIN — MIDODRINE HYDROCHLORIDE 10 MILLIGRAM(S): 5 TABLET ORAL at 11:46

## 2025-01-22 RX ADMIN — APIXABAN 5 MILLIGRAM(S): 5 TABLET, FILM COATED ORAL at 18:04

## 2025-01-22 RX ADMIN — Medication 5 MILLIGRAM(S): at 11:46

## 2025-01-22 RX ADMIN — Medication 50 MILLIGRAM(S): at 21:43

## 2025-01-22 RX ADMIN — Medication 320 MILLIGRAM(S): at 05:51

## 2025-01-22 RX ADMIN — ENOXAPARIN SODIUM 100 MILLIGRAM(S): 100 INJECTION SUBCUTANEOUS at 05:56

## 2025-01-22 RX ADMIN — Medication 40 MILLIGRAM(S): at 18:04

## 2025-01-22 RX ADMIN — ASPIRIN 81 MILLIGRAM(S): 81 TABLET, COATED ORAL at 11:46

## 2025-01-22 RX ADMIN — Medication 40 MILLIGRAM(S): at 05:54

## 2025-01-22 RX ADMIN — ATORVASTATIN CALCIUM 40 MILLIGRAM(S): 80 TABLET, FILM COATED ORAL at 21:42

## 2025-01-22 RX ADMIN — AMIODARONE HYDROCHLORIDE 200 MILLIGRAM(S): 50 INJECTION, SOLUTION INTRAVENOUS at 05:51

## 2025-01-22 RX ADMIN — Medication 25 MILLIGRAM(S): at 18:04

## 2025-01-22 NOTE — CONSULT NOTE ADULT - SUBJECTIVE AND OBJECTIVE BOX
CARDIAC ELECTROPHYSIOLOGY  [facility] Faculty [facility]   Office: [address] [address]  Telephone: [phone] Fax:[phone]      HPI:  84 year old male with Hypertension, HF, CAD s/p PCI, Severe Mitral regurgitation s/p Clip (4/24), BPH, Prostate Ca s/p radiation therapy still on hormone therapy, A.fib (on Eliquis) s/p recent unsuccessful DCCV on 1/13/24 who presented to [facility] ED on 1/21 with dyspnea and palp x ~4 weeks.  In regards to pt's A.fib, pt was first diagnosed recently in early December 2024 when he made an appointment with his oncologist due to SOB that was not resolving.  Pt states that this had been going on for a while but assumed the symptoms were from the radiation therapy he was having to treat his Prostate Ca.  Pt was found to be in new onset A.fib, and was sent to the local hospital [facility] in [place], [place] where he was started on Eliquis and Amiodarone.  Pt was discharged on Amiodarone 200mg bid x 7 days, and then started on Amiodarone 200mg daily.  Pt then went for an elective DCCV on 1/13 which as per pt "I was shocked twice but it was unsuccessful". Pt remained on Amiodarone 200mg daily and Eliquis 5mg bid.  Pt now presents to the ED as his symptoms of SOB has worsened and is experiencing HOGAN associated with chest tightness, palpitations, dizziness and intermittent pedal edema. Progressively worsening so much that he couldn't walk so came to ER.  EP now consulted for A.fib management.    Cariology Summary:    TTE 1/21/25 CONCLUSIONS:   1. Left ventricular cavity is normal in size. The interventricular septum is flattened in systole and diastole consistent with right ventricular pressure and volume overload. Left ventricular systolic function is hyperdynamic with an ejectionfraction visually estimated at >75 %.   2. Normal right ventricular cavity size and normal right ventricular systolic function.   3. Mild left ventricular hypertrophy.   4. Mild mitral regurgitation.   5. MitraClip visualized with a mean gradient of 6.75 mmHg through the mitral valve.   6. Mild to moderate tricuspid regurgitation.   7. Mild to moderate pulmonary hypertension.   8. Trileaflet aortic valve with normal systolic excursion.   9. Trace aortic regurgitation.  10. Large bilateral pleuraleffusion noted.  11. Moderate pericardial effusion with no echocardiographic evidence of tamponade physiology.    ECG 1/21/25 -  Atrial fibrillation 89bpm with premature ventricular, QRS Duration 96 ms        PAST MEDICAL & SURGICAL HISTORY:  Atrial fibrillation  HTN (hypertension)  CAD (coronary artery disease)  BPH without urinary obstruction  Mitral regurgitation  S/P appendectomy  S/P mitral valve clip implantation          REVIEW OF SYSTEMS:    CONSTITUTIONAL: No fever, weight loss, or fatigue  EYES: No visual disturbances  NECK: No pain or stiffness  RESPIRATORY: No cough, wheezing, chills or hemoptysis; No shortness of breath  CARDIOVASCULAR: see HPI  GASTROINTESTINAL: No abdominal or epigastric pain. No nausea, vomiting, or hematemesis; No diarrhea or constipation. No melena or hematochezia.  NEUROLOGICAL: No headaches, memory loss, loss of strength, numbness, or tremors  SKIN: No itching, burning, rashes, or lesions   LYMPH NODES: No enlarged glands  ENDOCRINE: No heat or cold intolerance; No hair loss  PSYCHIATRIC: No depression, anxiety, mood swings, or difficulty sleeping  HEME/LYMPH: No easy bruising, or bleeding gums      MEDICATIONS  (STANDING):  aMIOdarone    Tablet 200 milliGRAM(s) Oral daily  aspirin enteric coated 81 milliGRAM(s) Oral daily  atorvastatin 40 milliGRAM(s) Oral at bedtime  carvedilol 25 milliGRAM(s) Oral every 12 hours  colchicine 0.6 milliGRAM(s) Oral two times a day  enoxaparin Injectable 100 milliGRAM(s) SubCutaneous every 12 hours  finasteride 5 milliGRAM(s) Oral daily  furosemide   Injectable 40 milliGRAM(s) IV Push every 12 hours  hydrALAZINE 50 milliGRAM(s) Oral two times a day  valsartan 320 milliGRAM(s) Oral daily    MEDICATIONS  (PRN):  zolpidem 5 milliGRAM(s) Oral at bedtime PRN Insomnia      Allergies  No Known Allergies      SOCIAL HISTORY:  Tobacco use: Denies  Alcohol use: Social use (1 or less glasses of wine per month)  Illicit drug use: Denies      FAMILY HISTORY:  Brother and sister with HTN diagnosed in there 50's      Vital Signs Last 24 Hrs  T(C): 36.3 (22 Jan 2025 11:11), Max: 36.6 (22 Jan 2025 00:56)  T(F): 97.3 (22 Jan 2025 11:11), Max: 97.9 (22 Jan 2025 00:56)  HR: 77 (22 Jan 2025 11:11) (77 - 87)  BP: 108/60 (22 Jan 2025 07:40) (108/60 - 156/78)  BP(mean): --  RR: 18 (22 Jan 2025 07:40) (18 - 20)  SpO2: 95% (22 Jan 2025 11:11) (92% - 96%)    Parameters below as of 22 Jan 2025 11:11  Patient On (Oxygen Delivery Method): nasal cannula  O2 Flow (L/min): 3      Physical Exam:  Constitutional: AAOx3  Cardiovascular: +S1S2, Irregularlly irregular no murmurs, rubs, gallops   Pulmonary: Decreased breath sounds at bases b/l, no wheezes, rales. rhonci  Abdomen: soft NTND  Extremities: 1+ pedal edema b/l  Neuro: non focal, speech clear, HAN x 4    LABS:                        12.1   6.17  )-----------( Clumped    ( 21 Jan 2025 11:20 )             37.2   01-21    142  |  107  |  27.2[H]  ----------------------------<  117[H]  3.9   |  22.0  |  0.90    Ca    8.3[L]      21 Jan 2025 11:20    TPro  6.3[L]  /  Alb  3.5  /  TBili  0.7  /  DBili  x   /  AST  17  /  ALT  15  /  AlkPhos  59  01-21  LIVER FUNCTIONS - ( 21 Jan 2025 11:20 )  Alb: 3.5 g/dL / Pro: 6.3 g/dL / ALK PHOS: 59 U/L / ALT: 15 U/L / AST: 17 U/L / GGT: x           PT/INR - ( 21 Jan 2025 11:20 )   PT: 18.9 sec;   INR: 1.64 ratio         PTT - ( 21 Jan 2025 11:20 )  PTT:36.3 sec    Urinalysis Basic - ( 21 Jan 2025 11:20 )    Color: x / Appearance: x / SG: x / pH: x  Gluc: 117 mg/dL / Ketone: x  / Bili: x / Urobili: x   Blood: x / Protein: x / Nitrite: x   Leuk Esterase: x / RBC: x / WBC x   Sq Epi: x / Non Sq Epi: x / Bacteria: x      A/P  84 year old male with Hypertension, HF, CAD s/p PCI, Severe Mitral regurgitation s/p Clip (4/24), BPH, Prostate Ca s/p radiation therapy still on hormone therapy, A.fib (on Eliquis) s/p recent unsuccessful DCCV on 1/13/24 who presented to [facility] ED on 1/21 with dyspnea and palp x ~4 weeks.  In regards to pt's A.fib, pt was first diagnosed recently in early December 2024 when he made an appointment with his oncologist due to SOB that was not resolving.  Pt states that this had been going on for a while but assumed the symptoms were from the radiation therapy he was having to treat his Prostate Ca.  Pt was found to be in new onset A.fib, and was sent to the local hospital [facility] in [place], [place] where he was started on Eliquis and Amiodarone.  Pt was discharged on Amiodarone 200mg bid x 7 days, and then started on Amiodarone 200mg daily.  Pt then went for an elective DCCV on 1/13 which as per pt "I was shocked twice but it was unsuccessful". Pt remained on Amiodarone 200mg daily and Eliquis 5mg bid.  Pt now presents to the ED as his symptoms of SOB has worsened and is experiencing HOGAN associated with chest tightness, palpitations, dizziness and intermittent pedal edema. Progressively worsening so much that he couldn't walk so came to ER.  EP now consulted for A.fib management.    Telemetry- A.fib with ventricular rates well controlled in the 70-80's with occasion PVC    -Document incomplete   CARDIAC ELECTROPHYSIOLOGY  Health system[facility] Faculty [facility]   Office: [address] [address]  Telephone: [phone] Fax:[phone]      HPI:  84 year old male with Hypertension, HF, CAD s/p PCI, Severe Mitral regurgitation s/p Clip (4/24), BPH, Prostate Ca s/p radiation therapy still on hormone therapy, A.fib (on Eliquis) s/p recent unsuccessful DCCV on 1/13/24 who presented to [facility] ED on 1/21 with dyspnea and palp x ~4 weeks.  In regards to pt's A.fib, pt was first diagnosed recently in early December 2024 when he made an appointment with his oncologist due to SOB that was not resolving.  Pt states that this had been going on for a while but assumed the symptoms were from the radiation therapy he was having to treat his Prostate Ca.  Pt was found to be in new onset A.fib, and was sent to the local hospital [facility] in [place], [place] where he was started on Eliquis and Amiodarone.  Pt was discharged on Amiodarone 200mg bid x 7 days, and then started on Amiodarone 200mg daily.  Pt then went for an elective DCCV on 1/13 which as per pt "I was shocked twice but it was unsuccessful". Pt remained on Amiodarone 200mg daily and Eliquis 5mg bid.  Pt now presents to the ED as his symptoms of SOB has worsened and is experiencing HOGAN associated with chest tightness, palpitations, dizziness and intermittent pedal edema. Progressively worsening so much that he couldn't walk so came to ER.  EP now consulted for A.fib management.    Cariology Summary:    TTE 1/21/25 CONCLUSIONS:   1. Left ventricular cavity is normal in size. The interventricular septum is flattened in systole and diastole consistent with right ventricular pressure and volume overload. Left ventricular systolic function is hyperdynamic with an ejectionfraction visually estimated at >75 %.   2. Normal right ventricular cavity size and normal right ventricular systolic function.   3. Mild left ventricular hypertrophy.   4. Mild mitral regurgitation.   5. MitraClip visualized with a mean gradient of 6.75 mmHg through the mitral valve.   6. Mild to moderate tricuspid regurgitation.   7. Mild to moderate pulmonary hypertension.   8. Trileaflet aortic valve with normal systolic excursion.   9. Trace aortic regurgitation.  10. Large bilateral pleuraleffusion noted.  11. Moderate pericardial effusion with no echocardiographic evidence of tamponade physiology.    ECG 1/21/25 -  Atrial fibrillation 89bpm with premature ventricular, QRS Duration 96 ms        PAST MEDICAL & SURGICAL HISTORY:  Atrial fibrillation  HTN (hypertension)  CAD (coronary artery disease)  BPH without urinary obstruction  Mitral regurgitation  S/P appendectomy  S/P mitral valve clip implantation          REVIEW OF SYSTEMS:    CONSTITUTIONAL: No fever, weight loss, or fatigue  EYES: No visual disturbances  NECK: No pain or stiffness  RESPIRATORY: No cough, wheezing, chills or hemoptysis; No shortness of breath  CARDIOVASCULAR: see HPI  GASTROINTESTINAL: No abdominal or epigastric pain. No nausea, vomiting, or hematemesis; No diarrhea or constipation. No melena or hematochezia.  NEUROLOGICAL: No headaches, memory loss, loss of strength, numbness, or tremors  SKIN: No itching, burning, rashes, or lesions   LYMPH NODES: No enlarged glands  ENDOCRINE: No heat or cold intolerance; No hair loss  PSYCHIATRIC: No depression, anxiety, mood swings, or difficulty sleeping  HEME/LYMPH: No easy bruising, or bleeding gums      MEDICATIONS  (STANDING):  aMIOdarone    Tablet 200 milliGRAM(s) Oral daily  aspirin enteric coated 81 milliGRAM(s) Oral daily  atorvastatin 40 milliGRAM(s) Oral at bedtime  carvedilol 25 milliGRAM(s) Oral every 12 hours  colchicine 0.6 milliGRAM(s) Oral two times a day  enoxaparin Injectable 100 milliGRAM(s) SubCutaneous every 12 hours  finasteride 5 milliGRAM(s) Oral daily  furosemide   Injectable 40 milliGRAM(s) IV Push every 12 hours  hydrALAZINE 50 milliGRAM(s) Oral two times a day  valsartan 320 milliGRAM(s) Oral daily    MEDICATIONS  (PRN):  zolpidem 5 milliGRAM(s) Oral at bedtime PRN Insomnia      Allergies  No Known Allergies      SOCIAL HISTORY:  Tobacco use: Denies  Alcohol use: Social use (1 or less glasses of wine per month)  Illicit drug use: Denies      FAMILY HISTORY:  Brother and sister with HTN diagnosed in there 50's      Vital Signs Last 24 Hrs  T(C): 36.3 (22 Jan 2025 11:11), Max: 36.6 (22 Jan 2025 00:56)  T(F): 97.3 (22 Jan 2025 11:11), Max: 97.9 (22 Jan 2025 00:56)  HR: 77 (22 Jan 2025 11:11) (77 - 87)  BP: 108/60 (22 Jan 2025 07:40) (108/60 - 156/78)  RR: 18 (22 Jan 2025 07:40) (18 - 20)  SpO2: 95% (22 Jan 2025 11:11) (92% - 96%)    Parameters below as of 22 Jan 2025 11:11  Patient On (Oxygen Delivery Method): nasal cannula  O2 Flow (L/min): 3      Physical Exam:  Constitutional: AAOx3  Cardiovascular: +S1S2, Irregularlly irregular no murmurs, rubs, gallops   Pulmonary: Decreased breath sounds at bases b/l, no wheezes, rales. rhonci  Abdomen: soft NTND  Extremities: 1+ pedal edema b/l  Neuro: non focal, speech clear, HAN x 4    LABS:                        12.1   6.17  )-----------( Clumped    ( 21 Jan 2025 11:20 )             37.2   01-21    142  |  107  |  27.2[H]  ----------------------------<  117[H]  3.9   |  22.0  |  0.90    Ca    8.3[L]      21 Jan 2025 11:20    TPro  6.3[L]  /  Alb  3.5  /  TBili  0.7  /  DBili  x   /  AST  17  /  ALT  15  /  AlkPhos  59  01-21  LIVER FUNCTIONS - ( 21 Jan 2025 11:20 )  Alb: 3.5 g/dL / Pro: 6.3 g/dL / ALK PHOS: 59 U/L / ALT: 15 U/L / AST: 17 U/L / GGT: x           PT/INR - ( 21 Jan 2025 11:20 )   PT: 18.9 sec;   INR: 1.64 ratio         PTT - ( 21 Jan 2025 11:20 )  PTT:36.3 sec    Urinalysis Basic - ( 21 Jan 2025 11:20 )    Color: x / Appearance: x / SG: x / pH: x  Gluc: 117 mg/dL / Ketone: x  / Bili: x / Urobili: x   Blood: x / Protein: x / Nitrite: x   Leuk Esterase: x / RBC: x / WBC x   Sq Epi: x / Non Sq Epi: x / Bacteria: x      A/P  84 year old male with Hypertension, HF, CAD s/p PCI, Severe Mitral regurgitation s/p Clip (4/24), BPH, Prostate Ca s/p radiation therapy still on hormone therapy, A.fib (on Eliquis) s/p recent unsuccessful DCCV on 1/13/24 who presented to [facility] ED on 1/21 with dyspnea and palp x ~4 weeks.  In regards to pt's A.fib, pt was first diagnosed recently in early December 2024 when he made an appointment with his oncologist due to SOB that was not resolving.  Pt states that this had been going on for a while but assumed the symptoms were from the radiation therapy he was having to treat his Prostate Ca.  Pt was found to be in new onset A.fib, and was sent to the local hospital down in [place], [place] where he was started on Eliquis and Amiodarone.  Pt was discharged on Amiodarone 200mg bid x 7 days, and then started on Amiodarone 200mg daily.  Pt then went for an elective DCCV on 1/13 which as per pt "I was shocked twice but it was unsuccessful". Pt remained on Amiodarone 200mg daily and Eliquis 5mg bid.  Pt now presents to the ED as his symptoms of SOB has worsened and is experiencing HOGAN associated with chest tightness, palpitations, dizziness and intermittent pedal edema. Progressively worsening so much that he couldn't walk so came to ER.  EP now consulted for A.fib management.    Telemetry- A.fib with ventricular rates well controlled in the 70-80's with occasion PVC    Recommendations    Highly symptomatic persistent A.fib    - Pt previously with unsuccessful cardioversion x 2 despite receiving at least 8.5 grams on Amiodarone (was prescribed Amiodarone 200mg bid x 7 days followed by 200mg daily upon discharge from hospitalization on 12/6-12/7).  Awaiting documentation from that attempted DCCV that was in [place] on 1/13/24, but it sounds like the cardioversion was never successful (as oppose to being transiently successful and having reoccurrence of A.fib afterwards).  If that is the case and there are no other provoking factors, additional attempts of DCCV are unlikely to be successful.  - Obtain thyroid function tests to r/o other provoking factors.  - Will review documentation from previous DCCV to ensure that the current pericardial effusion was not present at time of the previous DCCV as that may also have been a provoking factor.  - If no other provoking factors can be found, will consider a pace and ablate strategy, but pt will need to have pericardial effusion addressed prior to this and should also complete 30 days of uninterrupted AC s/p previous DCCV attempt on 1/13  -Continue Full dose anticoagulation and continue with Amiodarone 200mg daily.  -Considered MCOT monitor on discharge to assess true A.fib burden  Case d/w Dr Umanzor, Further recommendations to follow.      -Document incomplete

## 2025-01-22 NOTE — CONSULT NOTE ADULT - NS ATTEND AMEND GEN_ALL_CORE FT
Will appreciate cardiology input on his effusion, which can be interpreted as a provoking factor for not sustaining sinus, and may complicate procedures such as PPM placement. He will likely be a good candidate for a pace and ablate strategy, assuming no meaningful factors are recognized for the failure of previous DCCVs despite appropriate amiodarone load. If such are recognized- reasonable to attempt one more DCCV. If not, will proceed with a PPM and subsequent AVJ ablation electively after resolution of the effusion.
Patient seen and examined by me.  Will also give Metalozone 2.5 mg daily for 2 days  I have discussed my recommendation with the PA which are outlined above.  Will follow.

## 2025-01-22 NOTE — PROGRESS NOTE ADULT - PROBLEM SELECTOR PLAN 3
Failed cardioversion after amiodarone load  Rate is control  Role for ablation?  Ep consult Failed cardioversion after amiodarone load  Rate is control  Role for ablation?  Ep consult    Cardiac meds  aMIOdarone    Tablet 200 milliGRAM(s) Oral daily  aspirin enteric coated 81 milliGRAM(s) Oral daily  atorvastatin 40 milliGRAM(s) Oral at bedtime  carvedilol 25 milliGRAM(s) Oral every 12 hours  furosemide   Injectable 40 milliGRAM(s) IV Push every 12 hours  hydrALAZINE 50 milliGRAM(s) Oral two times a day  potassium chloride    Tablet ER 40 milliEquivalent(s) Oral once  valsartan 320 milliGRAM(s) Oral

## 2025-01-22 NOTE — PROGRESS NOTE ADULT - PROBLEM SELECTOR PLAN 1
EF 75%  Ischemic work up 2/24 open vessels  Low na diet  Intake and out put  Good response to Lasix and metolazone  Replace k  Keep k >4 and mg >2  monitor electrolytes with ongoing diuresis EF 75%  Ischemic work up 2/24 open vessels  (trop mildy elevated in setting of heart failure )  Low na diet  Intake and out put  Good response to Lasix and metolazone  Replace k  Keep k >4 and mg >2  monitor electrolytes with ongoing diuresis  Norvasc d/karen due to edema  c/w valsartan, coreg and hydralazine  consider Farxiga out patient

## 2025-01-23 ENCOUNTER — APPOINTMENT (OUTPATIENT)
Dept: ELECTROPHYSIOLOGY | Facility: CLINIC | Age: 85
End: 2025-01-23

## 2025-01-23 DIAGNOSIS — D69.6 THROMBOCYTOPENIA, UNSPECIFIED: ICD-10-CM

## 2025-01-23 LAB
ALBUMIN SERPL ELPH-MCNC: 3.4 G/DL — SIGNIFICANT CHANGE UP (ref 3.3–5.2)
ALP SERPL-CCNC: 52 U/L — SIGNIFICANT CHANGE UP (ref 40–120)
ALT FLD-CCNC: 14 U/L — SIGNIFICANT CHANGE UP
ANION GAP SERPL CALC-SCNC: 11 MMOL/L — SIGNIFICANT CHANGE UP (ref 5–17)
AST SERPL-CCNC: 15 U/L — SIGNIFICANT CHANGE UP
BASOPHILS # BLD AUTO: 0.04 K/UL — SIGNIFICANT CHANGE UP (ref 0–0.2)
BASOPHILS NFR BLD AUTO: 0.7 % — SIGNIFICANT CHANGE UP (ref 0–2)
BILIRUB DIRECT SERPL-MCNC: 0.2 MG/DL — SIGNIFICANT CHANGE UP (ref 0–0.3)
BILIRUB INDIRECT FLD-MCNC: 0.5 MG/DL — SIGNIFICANT CHANGE UP (ref 0.2–1)
BILIRUB SERPL-MCNC: 0.7 MG/DL — SIGNIFICANT CHANGE UP (ref 0.4–2)
BUN SERPL-MCNC: 42.2 MG/DL — HIGH (ref 8–20)
CALCIUM SERPL-MCNC: 8.5 MG/DL — SIGNIFICANT CHANGE UP (ref 8.4–10.5)
CHLORIDE SERPL-SCNC: 108 MMOL/L — SIGNIFICANT CHANGE UP (ref 96–108)
CK SERPL-CCNC: 77 U/L — SIGNIFICANT CHANGE UP (ref 30–200)
CLOSURE TME COLL+EPINEP BLD: 158 K/UL — SIGNIFICANT CHANGE UP (ref 150–400)
CO2 SERPL-SCNC: 26 MMOL/L — SIGNIFICANT CHANGE UP (ref 22–29)
CREAT SERPL-MCNC: 1.08 MG/DL — SIGNIFICANT CHANGE UP (ref 0.5–1.3)
EGFR: 68 ML/MIN/1.73M2 — SIGNIFICANT CHANGE UP
EOSINOPHIL # BLD AUTO: 0.12 K/UL — SIGNIFICANT CHANGE UP (ref 0–0.5)
EOSINOPHIL NFR BLD AUTO: 2 % — SIGNIFICANT CHANGE UP (ref 0–6)
GLUCOSE SERPL-MCNC: 106 MG/DL — HIGH (ref 70–99)
HCT VFR BLD CALC: 36.7 % — LOW (ref 39–50)
HGB BLD-MCNC: 11.5 G/DL — LOW (ref 13–17)
IMM GRANULOCYTES NFR BLD AUTO: 0.5 % — SIGNIFICANT CHANGE UP (ref 0–0.9)
LYMPHOCYTES # BLD AUTO: 0.74 K/UL — LOW (ref 1–3.3)
LYMPHOCYTES # BLD AUTO: 12.2 % — LOW (ref 13–44)
MAGNESIUM SERPL-MCNC: 2.3 MG/DL — SIGNIFICANT CHANGE UP (ref 1.8–2.6)
MCHC RBC-ENTMCNC: 29.9 PG — SIGNIFICANT CHANGE UP (ref 27–34)
MCHC RBC-ENTMCNC: 31.3 G/DL — LOW (ref 32–36)
MCV RBC AUTO: 95.6 FL — SIGNIFICANT CHANGE UP (ref 80–100)
MONOCYTES # BLD AUTO: 0.67 K/UL — SIGNIFICANT CHANGE UP (ref 0–0.9)
MONOCYTES NFR BLD AUTO: 11 % — SIGNIFICANT CHANGE UP (ref 2–14)
NEUTROPHILS # BLD AUTO: 4.48 K/UL — SIGNIFICANT CHANGE UP (ref 1.8–7.4)
NEUTROPHILS NFR BLD AUTO: 73.6 % — SIGNIFICANT CHANGE UP (ref 43–77)
PHOSPHATE SERPL-MCNC: 4.3 MG/DL — SIGNIFICANT CHANGE UP (ref 2.4–4.7)
PLATELET # BLD AUTO: 58 K/UL — LOW (ref 150–400)
POTASSIUM SERPL-MCNC: 3.8 MMOL/L — SIGNIFICANT CHANGE UP (ref 3.5–5.3)
POTASSIUM SERPL-SCNC: 3.8 MMOL/L — SIGNIFICANT CHANGE UP (ref 3.5–5.3)
PROT SERPL-MCNC: 5.9 G/DL — LOW (ref 6.6–8.7)
RBC # BLD: 3.84 M/UL — LOW (ref 4.2–5.8)
RBC # FLD: 15.7 % — HIGH (ref 10.3–14.5)
SODIUM SERPL-SCNC: 145 MMOL/L — SIGNIFICANT CHANGE UP (ref 135–145)
T3 SERPL-MCNC: 101 NG/DL — SIGNIFICANT CHANGE UP (ref 80–200)
T4 AB SER-ACNC: 10.9 UG/DL — SIGNIFICANT CHANGE UP (ref 4.5–12)
T4 FREE SERPL-MCNC: 1.9 NG/DL — HIGH (ref 0.9–1.7)
TSH SERPL-MCNC: 1.51 UIU/ML — SIGNIFICANT CHANGE UP (ref 0.27–4.2)
WBC # BLD: 6.08 K/UL — SIGNIFICANT CHANGE UP (ref 3.8–10.5)
WBC # FLD AUTO: 6.08 K/UL — SIGNIFICANT CHANGE UP (ref 3.8–10.5)

## 2025-01-23 PROCEDURE — 99232 SBSQ HOSP IP/OBS MODERATE 35: CPT

## 2025-01-23 PROCEDURE — 71045 X-RAY EXAM CHEST 1 VIEW: CPT | Mod: 26

## 2025-01-23 PROCEDURE — 99232 SBSQ HOSP IP/OBS MODERATE 35: CPT | Mod: 25

## 2025-01-23 RX ADMIN — APIXABAN 5 MILLIGRAM(S): 5 TABLET, FILM COATED ORAL at 05:58

## 2025-01-23 RX ADMIN — Medication 5 MILLIGRAM(S): at 17:13

## 2025-01-23 RX ADMIN — ATORVASTATIN CALCIUM 40 MILLIGRAM(S): 80 TABLET, FILM COATED ORAL at 21:27

## 2025-01-23 RX ADMIN — AMIODARONE HYDROCHLORIDE 200 MILLIGRAM(S): 50 INJECTION, SOLUTION INTRAVENOUS at 05:58

## 2025-01-23 RX ADMIN — APIXABAN 5 MILLIGRAM(S): 5 TABLET, FILM COATED ORAL at 17:13

## 2025-01-23 RX ADMIN — COLCHICINE 0.6 MILLIGRAM(S): 0.6 TABLET ORAL at 17:13

## 2025-01-23 RX ADMIN — Medication 25 MILLIGRAM(S): at 05:57

## 2025-01-23 RX ADMIN — Medication 50 MILLIGRAM(S): at 17:14

## 2025-01-23 RX ADMIN — COLCHICINE 0.6 MILLIGRAM(S): 0.6 TABLET ORAL at 05:58

## 2025-01-23 RX ADMIN — ASPIRIN 81 MILLIGRAM(S): 81 TABLET, COATED ORAL at 17:13

## 2025-01-23 RX ADMIN — Medication 40 MILLIGRAM(S): at 17:13

## 2025-01-23 RX ADMIN — Medication 40 MILLIGRAM(S): at 05:57

## 2025-01-23 RX ADMIN — Medication 25 MILLIGRAM(S): at 17:14

## 2025-01-23 NOTE — PROGRESS NOTE ADULT - PROBLEM SELECTOR PLAN 1
Intake and out put not accurately measured  Edema is down  Will check CXR had significant effusions on admission    GDMT:  carvedilol 25 milliGRAM(s) Oral every 12 hours  valsartan 320 milliGRAM(s) Oral daily

## 2025-01-23 NOTE — PROGRESS NOTE ADULT - PROBLEM SELECTOR PLAN 3
Rate controlled   Seen by EP and has appointment    CARDIAC MEDS  aMIOdarone    Tablet 200 milliGRAM(s) Oral daily  apixaban 5 milliGRAM(s) Oral every 12 hours  aspirin enteric coated 81 milliGRAM(s) Oral daily  atorvastatin 40 milliGRAM(s) Oral at bedtime  carvedilol 25 milliGRAM(s) Oral every 12 hours  colchicine 0.6 milliGRAM(s) Oral two times a day  furosemide   Injectable 40 milliGRAM(s) IV Push every 12 hours  hydrALAZINE 50 milliGRAM(s) Oral two times a day  valsartan 320 milliGRAM(s) Oral daily

## 2025-01-24 ENCOUNTER — TRANSCRIPTION ENCOUNTER (OUTPATIENT)
Age: 85
End: 2025-01-24

## 2025-01-24 VITALS
OXYGEN SATURATION: 94 % | DIASTOLIC BLOOD PRESSURE: 69 MMHG | HEART RATE: 86 BPM | TEMPERATURE: 98 F | RESPIRATION RATE: 18 BRPM | SYSTOLIC BLOOD PRESSURE: 112 MMHG

## 2025-01-24 LAB
ANION GAP SERPL CALC-SCNC: 14 MMOL/L — SIGNIFICANT CHANGE UP (ref 5–17)
BUN SERPL-MCNC: 47 MG/DL — HIGH (ref 8–20)
CALCIUM SERPL-MCNC: 8.5 MG/DL — SIGNIFICANT CHANGE UP (ref 8.4–10.5)
CHLORIDE SERPL-SCNC: 106 MMOL/L — SIGNIFICANT CHANGE UP (ref 96–108)
CO2 SERPL-SCNC: 25 MMOL/L — SIGNIFICANT CHANGE UP (ref 22–29)
CREAT SERPL-MCNC: 1.06 MG/DL — SIGNIFICANT CHANGE UP (ref 0.5–1.3)
EGFR: 70 ML/MIN/1.73M2 — SIGNIFICANT CHANGE UP
GLUCOSE SERPL-MCNC: 93 MG/DL — SIGNIFICANT CHANGE UP (ref 70–99)
HCT VFR BLD CALC: 35.6 % — LOW (ref 39–50)
HGB BLD-MCNC: 11.2 G/DL — LOW (ref 13–17)
MCHC RBC-ENTMCNC: 29.8 PG — SIGNIFICANT CHANGE UP (ref 27–34)
MCHC RBC-ENTMCNC: 31.5 G/DL — LOW (ref 32–36)
MCV RBC AUTO: 94.7 FL — SIGNIFICANT CHANGE UP (ref 80–100)
PLATELET # BLD AUTO: 57 K/UL — LOW (ref 150–400)
POTASSIUM SERPL-MCNC: 3.5 MMOL/L — SIGNIFICANT CHANGE UP (ref 3.5–5.3)
POTASSIUM SERPL-SCNC: 3.5 MMOL/L — SIGNIFICANT CHANGE UP (ref 3.5–5.3)
RBC # BLD: 3.76 M/UL — LOW (ref 4.2–5.8)
RBC # FLD: 15.7 % — HIGH (ref 10.3–14.5)
SODIUM SERPL-SCNC: 145 MMOL/L — SIGNIFICANT CHANGE UP (ref 135–145)
WBC # BLD: 5.48 K/UL — SIGNIFICANT CHANGE UP (ref 3.8–10.5)
WBC # FLD AUTO: 5.48 K/UL — SIGNIFICANT CHANGE UP (ref 3.8–10.5)

## 2025-01-24 PROCEDURE — 84436 ASSAY OF TOTAL THYROXINE: CPT

## 2025-01-24 PROCEDURE — 93005 ELECTROCARDIOGRAM TRACING: CPT

## 2025-01-24 PROCEDURE — 80076 HEPATIC FUNCTION PANEL: CPT

## 2025-01-24 PROCEDURE — 99232 SBSQ HOSP IP/OBS MODERATE 35: CPT

## 2025-01-24 PROCEDURE — 84480 ASSAY TRIIODOTHYRONINE (T3): CPT

## 2025-01-24 PROCEDURE — 85730 THROMBOPLASTIN TIME PARTIAL: CPT

## 2025-01-24 PROCEDURE — 82550 ASSAY OF CK (CPK): CPT

## 2025-01-24 PROCEDURE — 86140 C-REACTIVE PROTEIN: CPT

## 2025-01-24 PROCEDURE — 83735 ASSAY OF MAGNESIUM: CPT

## 2025-01-24 PROCEDURE — 71045 X-RAY EXAM CHEST 1 VIEW: CPT

## 2025-01-24 PROCEDURE — 93970 EXTREMITY STUDY: CPT

## 2025-01-24 PROCEDURE — 84443 ASSAY THYROID STIM HORMONE: CPT

## 2025-01-24 PROCEDURE — 36415 COLL VENOUS BLD VENIPUNCTURE: CPT

## 2025-01-24 PROCEDURE — 96374 THER/PROPH/DIAG INJ IV PUSH: CPT

## 2025-01-24 PROCEDURE — C8929: CPT

## 2025-01-24 PROCEDURE — 85025 COMPLETE CBC W/AUTO DIFF WBC: CPT

## 2025-01-24 PROCEDURE — 99239 HOSP IP/OBS DSCHRG MGMT >30: CPT

## 2025-01-24 PROCEDURE — 84100 ASSAY OF PHOSPHORUS: CPT

## 2025-01-24 PROCEDURE — 80053 COMPREHEN METABOLIC PANEL: CPT

## 2025-01-24 PROCEDURE — 85027 COMPLETE CBC AUTOMATED: CPT

## 2025-01-24 PROCEDURE — 83880 ASSAY OF NATRIURETIC PEPTIDE: CPT

## 2025-01-24 PROCEDURE — 71275 CT ANGIOGRAPHY CHEST: CPT | Mod: MC

## 2025-01-24 PROCEDURE — 85049 AUTOMATED PLATELET COUNT: CPT

## 2025-01-24 PROCEDURE — 80048 BASIC METABOLIC PNL TOTAL CA: CPT

## 2025-01-24 PROCEDURE — 87637 SARSCOV2&INF A&B&RSV AMP PRB: CPT

## 2025-01-24 PROCEDURE — 85610 PROTHROMBIN TIME: CPT

## 2025-01-24 PROCEDURE — 99285 EMERGENCY DEPT VISIT HI MDM: CPT

## 2025-01-24 PROCEDURE — 84439 ASSAY OF FREE THYROXINE: CPT

## 2025-01-24 PROCEDURE — 84484 ASSAY OF TROPONIN QUANT: CPT

## 2025-01-24 RX ORDER — TORSEMIDE 20 MG/1
1 TABLET ORAL
Qty: 0 | Refills: 0 | DISCHARGE
Start: 2025-01-24

## 2025-01-24 RX ORDER — APIXABAN 5 MG/1
1 TABLET, FILM COATED ORAL
Qty: 0 | Refills: 0 | DISCHARGE

## 2025-01-24 RX ORDER — TORSEMIDE 20 MG/1
10 TABLET ORAL DAILY
Refills: 0 | Status: DISCONTINUED | OUTPATIENT
Start: 2025-01-25 | End: 2025-01-24

## 2025-01-24 RX ORDER — COLCHICINE 0.6 MG/1
1 TABLET ORAL
Qty: 180 | Refills: 0
Start: 2025-01-24 | End: 2025-04-23

## 2025-01-24 RX ORDER — TORSEMIDE 20 MG/1
1 TABLET ORAL
Qty: 30 | Refills: 0
Start: 2025-01-24 | End: 2025-02-22

## 2025-01-24 RX ORDER — AMLODIPINE BESYLATE 5 MG
1 TABLET ORAL
Refills: 0 | DISCHARGE

## 2025-01-24 RX ADMIN — Medication 320 MILLIGRAM(S): at 09:48

## 2025-01-24 RX ADMIN — AMIODARONE HYDROCHLORIDE 200 MILLIGRAM(S): 50 INJECTION, SOLUTION INTRAVENOUS at 05:46

## 2025-01-24 RX ADMIN — Medication 5 MILLIGRAM(S): at 09:48

## 2025-01-24 RX ADMIN — APIXABAN 5 MILLIGRAM(S): 5 TABLET, FILM COATED ORAL at 05:45

## 2025-01-24 RX ADMIN — Medication 50 MILLIGRAM(S): at 05:46

## 2025-01-24 RX ADMIN — ASPIRIN 81 MILLIGRAM(S): 81 TABLET, COATED ORAL at 09:48

## 2025-01-24 RX ADMIN — Medication 25 MILLIGRAM(S): at 05:46

## 2025-01-24 RX ADMIN — COLCHICINE 0.6 MILLIGRAM(S): 0.6 TABLET ORAL at 05:47

## 2025-01-24 RX ADMIN — Medication 40 MILLIGRAM(S): at 05:45

## 2025-01-24 NOTE — DISCHARGE NOTE PROVIDER - INSTRUCTIONS
Diet, DASH/TLC:   Sodium & Cholesterol Restricted  1500mL Fluid Restriction ([id number]) (01-21-25 @ 16:04) [Active]

## 2025-01-24 NOTE — PROGRESS NOTE ADULT - SUBJECTIVE AND OBJECTIVE BOX
Hospitalist Daily Progress Note    Chief Complaint:  Patient is a 84y old  Male who presents with a chief complaint of Shortness of breath  Heart Failure (22 Jan 2025 12:46)      SUBJECTIVE / OVERNIGHT EVENTS:  Patient was seen and examined at bedside. Feeling better  Patient denies chest pain, SOB, abd pain, N/V, fever, chills, dysuria or any other complaints. All remainder ROS negative.     MEDICATIONS  (STANDING):  aMIOdarone    Tablet 200 milliGRAM(s) Oral daily  aspirin enteric coated 81 milliGRAM(s) Oral daily  atorvastatin 40 milliGRAM(s) Oral at bedtime  carvedilol 25 milliGRAM(s) Oral every 12 hours  colchicine 0.6 milliGRAM(s) Oral two times a day  enoxaparin Injectable 100 milliGRAM(s) SubCutaneous every 12 hours  finasteride 5 milliGRAM(s) Oral daily  furosemide   Injectable 40 milliGRAM(s) IV Push every 12 hours  hydrALAZINE 50 milliGRAM(s) Oral two times a day  valsartan 320 milliGRAM(s) Oral daily    MEDICATIONS  (PRN):  zolpidem 5 milliGRAM(s) Oral at bedtime PRN Insomnia        I&O's Summary    21 Jan 2025 07:01  -  22 Jan 2025 07:00  --------------------------------------------------------  IN: 900 mL / OUT: 0 mL / NET: 900 mL        PHYSICAL EXAM:  Vital Signs Last 24 Hrs  T(C): 36.3 (22 Jan 2025 11:11), Max: 36.6 (22 Jan 2025 00:56)  T(F): 97.3 (22 Jan 2025 11:11), Max: 97.9 (22 Jan 2025 00:56)  HR: 75 (22 Jan 2025 11:25) (75 - 87)  BP: 120/76 (22 Jan 2025 12:43) (80/40 - 156/78)  BP(mean): --  RR: 18 (22 Jan 2025 07:40) (18 - 20)  SpO2: 95% (22 Jan 2025 11:11) (92% - 96%)    Parameters below as of 22 Jan 2025 11:11  Patient On (Oxygen Delivery Method): nasal cannula  O2 Flow (L/min): 3    Constitutional: NAD, Resting, NC  ENT: Supple, No JVD  Lungs: CTA B/L, Non-labored breathing  Cardio: RRR, S1/S2, No murmur  Abdomen: Soft, Nontender, Nondistended; Bowel sounds present  Extremities: No calf tenderness, Edema  Musculoskeletal:   No joint swelling  Psych: Calm, cooperative affect appropriate  Neuro: Awake and alert, oriented x 4  Skin: No rashes; no palpable lesions    LABS:                        12.1   6.17  )-----------( Clumped    ( 21 Jan 2025 11:20 )             37.2     01-22    143  |  103  |  32.9[H]  ----------------------------<  131[H]  3.3[L]   |  26.0  |  1.25    Ca    8.7      22 Jan 2025 10:26    TPro  6.3[L]  /  Alb  3.5  /  TBili  0.7  /  DBili  x   /  AST  17  /  ALT  15  /  AlkPhos  59  01-21    PT/INR - ( 21 Jan 2025 11:20 )   PT: 18.9 sec;   INR: 1.64 ratio         PTT - ( 21 Jan 2025 11:20 )  PTT:36.3 sec      Urinalysis Basic - ( 22 Jan 2025 10:26 )    Color: x / Appearance: x / SG: x / pH: x  Gluc: 131 mg/dL / Ketone: x  / Bili: x / Urobili: x   Blood: x / Protein: x / Nitrite: x   Leuk Esterase: x / RBC: x / WBC x   Sq Epi: x / Non Sq Epi: x / Bacteria: x        CAPILLARY BLOOD GLUCOSE            RADIOLOGY REVIEWED  
Subjective: Pt seen and evaluated at bedside.  Pt currently on RA and denies any complaints of CP, palp, SOB, N/V/D, near-syncope or any other symptoms    TELE: A.fib with ventricular rates well controlled 70-80    MEDICATIONS  (STANDING):  aMIOdarone    Tablet 200 milliGRAM(s) Oral daily  apixaban 5 milliGRAM(s) Oral every 12 hours  aspirin enteric coated 81 milliGRAM(s) Oral daily  atorvastatin 40 milliGRAM(s) Oral at bedtime  carvedilol 25 milliGRAM(s) Oral every 12 hours  colchicine 0.6 milliGRAM(s) Oral two times a day  finasteride 5 milliGRAM(s) Oral daily  furosemide   Injectable 40 milliGRAM(s) IV Push every 12 hours  hydrALAZINE 50 milliGRAM(s) Oral two times a day  valsartan 320 milliGRAM(s) Oral daily    MEDICATIONS  (PRN):  zolpidem 5 milliGRAM(s) Oral at bedtime PRN Insomnia      Allergies  No Known Allergies        Vital Signs Last 24 Hrs  T(C): 36.2 (23 Jan 2025 08:28), Max: 36.6 (23 Jan 2025 04:04)  T(F): 97.2 (23 Jan 2025 08:28), Max: 97.9 (23 Jan 2025 04:04)  HR: 81 (23 Jan 2025 08:28) (73 - 93)  BP: 102/67 (23 Jan 2025 08:28) (98/60 - 145/82)  BP(mean): 87 (23 Jan 2025 05:55) (87 - 87)  RR: 17 (23 Jan 2025 08:28) (17 - 19)  SpO2: 94% (23 Jan 2025 08:28) (91% - 96%)    Parameters below as of 23 Jan 2025 08:28  Patient On (Oxygen Delivery Method): room air        Physical Exam:  Constitutional: NAD, AAOx3  Cardiovascular: +S1S2 irregularly irregular  Pulmonary: Decreased breath sounds at bases b/l, unlabored  GI: soft NTND  Extremities: trace pedal edema  Neuro: non focal, HAN x4    LABS:                        11.5   6.08  )-----------( 58       ( 23 Jan 2025 05:22 )             36.7     01-23    145  |  108  |  42.2[H]  ----------------------------<  106[H]  3.8   |  26.0  |  1.08    Ca    8.5      23 Jan 2025 05:22  Phos  4.3     01-23  Mg     2.3     01-23    TPro  5.9[L]  /  Alb  3.4  /  TBili  0.7  /  DBili  0.2  /  AST  15  /  ALT  14  /  AlkPhos  52  01-23      Urinalysis Basic - ( 23 Jan 2025 05:22 )    Color: x / Appearance: x / SG: x / pH: x  Gluc: 106 mg/dL / Ketone: x  / Bili: x / Urobili: x   Blood: x / Protein: x / Nitrite: x   Leuk Esterase: x / RBC: x / WBC x   Sq Epi: x / Non Sq Epi: x / Bacteria: x      A/P  84 year old male with Hypertension, HF, CAD s/p PCI, Severe Mitral regurgitation s/p Clip (4/24), BPH, Prostate Ca s/p radiation therapy still on hormone therapy, A.fib (on Eliquis) s/p recent unsuccessful DCCV on 1/13/24 who presented to [facility] ED on 1/21 with dyspnea and palp x ~4 weeks.  In regards to pt's A.fib, pt was first diagnosed recently in early on 12/2024 due to SOB that was not resolving.  Pt was loaded on Amiodarone and then underwent an elective DCCV on 1/13 which was unsuccessful".  EP was consulted for A.fib management    Recommendations  Highly symptomatic persistent A.fib  - It appears that there were no other provoking factors leading the the unsuccessful DCCV attempt on 1/13 depsite being adequately loaded with Amiodarone.  At this time another DCCV attempt is unlikely to be successful,  Can discontinue patients Amiodarone, and will move forward with a plan to place a single chamber permanent pacemaker and AV node ablation in the future.   These will both occur after pt is further optimized and does not require the pt to remain hospitalized.    -Continue Full dose anticoagulation for A.fib.  -Will need to repeat a TTE as an out-pt to assess pericardial effusion prior to moving forward with any plans for a PPM and AV node ablation  Case d/w Dr Umanzor, the pt, his wife, and his son Michael who agree with above.
                                                         [facility]                                                         CARDIOLOGY AT [facility]                                                                  [address], [place] [address]                                                         Telephone: [phone]. Fax:[phone]                                                                             PROGRESS NOTE    Reason for follow up: CHF/Afib  Update: States he feels well    Additional records obtained  Last echo 12/24 ER was 65% with mild pericardial effusion  Cath 2/24  No evidence of AS  Mild systemic hypertension  widely patent epicardial coronary arteries including RCA, L main circ om and Lad  widely patent setnt in the ostial to proximal segment of the LAD    Review of symptoms:   Cardiac:  No chest pain. No dyspnea. No palpitations.  Respiratory: no cough. No dyspnea  Gastrointestinal: No diarrhea. No abdominal pain. No bleeding.   Neuro: No focal neuro complaints.    Vitals:  T(C): 36.3 (01-22-25 @ 11:11), Max: 36.6 (01-22-25 @ 00:56)  HR: 75 (01-22-25 @ 11:25) (75 - 87)  BP: 120/76 (01-22-25 @ 12:43) (80/40 - 156/78)  RR: 18 (01-22-25 @ 07:40) (18 - 20)  SpO2: 95% (01-22-25 @ 11:11) (92% - 96%)  Wt(kg): --  I&O's Summary    21 Jan 2025 07:01  -  22 Jan 2025 07:00  --------------------------------------------------------  IN: 900 mL / OUT: 0 mL / NET: 900 mL      Weight (kg): 107 (01-21 @ 09:56)    PHYSICAL EXAM:  Appearance: Comfortable. No acute distress  HEENT:  Atraumatic. Normocephalic.  Normal oral mucosa  Neurologic: A & O x 3, no gross focal deficits.  Cardiovascular: RRR S1 S2, + murmur, no rubs/gallops. No JVD  Respiratory: Lungs clear to auscultation, unlabored   Gastrointestinal:  Soft, Non-tender, + BS  Lower Extremities:  decreased edema  Psychiatry: Patient is calm. No agitation.   Skin: warm and dry.    CURRENT MEDICATIONS:  MEDICATIONS  (STANDING):  aMIOdarone    Tablet 200 milliGRAM(s) Oral daily  aspirin enteric coated 81 milliGRAM(s) Oral daily  atorvastatin 40 milliGRAM(s) Oral at bedtime  carvedilol 25 milliGRAM(s) Oral every 12 hours  colchicine 0.6 milliGRAM(s) Oral two times a day  enoxaparin Injectable 100 milliGRAM(s) SubCutaneous every 12 hours  finasteride 5 milliGRAM(s) Oral daily  furosemide   Injectable 40 milliGRAM(s) IV Push every 12 hours  hydrALAZINE 50 milliGRAM(s) Oral two times a day  potassium chloride    Tablet ER 40 milliEquivalent(s) Oral once  valsartan 320 milliGRAM(s) Oral daily      LABS:	 	                        12.1   6.17  )-----------( Clumped    ( 21 Jan 2025 11:20 )             37.2     01-22    143  |  103  |  32.9[H]  ----------------------------<  131[H]  3.3[L]   |  26.0  |  1.25    Ca    8.7      22 Jan 2025 10:26    TPro  6.3[L]  /  Alb  3.5  /  TBili  0.7  /  DBili  x   /  AST  17  /  ALT  15  /  AlkPhos  59  01-21  TELEMETRY: Afib rate controlled    DIAGNOSTIC TESTING:  [ ] Echocardiogram: < from: TTE W or WO Ultrasound Enhancing Agent (01.21.25 @ 16:43) >   1. Left ventricular cavity is normal in size. The interventricular septum is flattened in systole and diastole consistent with right ventricular pressure and volume overload. Left ventricular systolic function is hyperdynamic with an ejectionfraction visually estimated at >75 %.   2. Normal right ventricular cavity size and normal right ventricular systolic function.   3. Mild left ventricular hypertrophy.   4. Mild mitral regurgitation.   5. MitraClip visualized with a mean gradient of 6.75 mmHg through the mitral valve.   6. Mild to moderate tricuspid regurgitation.   7. Mild to moderate pulmonary hypertension.   8. Trileaflet aortic valve with normal systolic excursion.   9. Trace aortic regurgitation.  10. Large bilateral pleuraleffusion noted.  11. Moderate pericardial effusion with no echocardiographic evidence of tamponade physiology.            
Tacos Tirado M.D.    Patient is a 84y old  Male who presents with a chief complaint of Shortness of breath  Heart Failure (23 Jan 2025 09:04)      SUBJECTIVE / OVERNIGHT EVENTS: no event overnight.     Patient denies chest pain, SOB, abd pain, N/V, fever, chills, dysuria or any other complaints. All remainder ROS negative.     MEDICATIONS  (STANDING):  aMIOdarone    Tablet 200 milliGRAM(s) Oral daily  apixaban 5 milliGRAM(s) Oral every 12 hours  aspirin enteric coated 81 milliGRAM(s) Oral daily  atorvastatin 40 milliGRAM(s) Oral at bedtime  carvedilol 25 milliGRAM(s) Oral every 12 hours  colchicine 0.6 milliGRAM(s) Oral two times a day  finasteride 5 milliGRAM(s) Oral daily  furosemide   Injectable 40 milliGRAM(s) IV Push every 12 hours  hydrALAZINE 50 milliGRAM(s) Oral two times a day  valsartan 320 milliGRAM(s) Oral daily    MEDICATIONS  (PRN):  zolpidem 5 milliGRAM(s) Oral at bedtime PRN Insomnia      I&O's Summary    22 Jan 2025 07:01  -  23 Jan 2025 07:00  --------------------------------------------------------  IN: 600 mL / OUT: 300 mL / NET: 300 mL        PHYSICAL EXAM:  Vital Signs Last 24 Hrs  T(C): 36.2 (23 Jan 2025 08:28), Max: 36.6 (23 Jan 2025 04:04)  T(F): 97.2 (23 Jan 2025 08:28), Max: 97.9 (23 Jan 2025 04:04)  HR: 81 (23 Jan 2025 08:28) (73 - 93)  BP: 102/67 (23 Jan 2025 08:28) (98/60 - 145/82)  BP(mean): 87 (23 Jan 2025 05:55) (87 - 87)  RR: 17 (23 Jan 2025 08:28) (17 - 19)  SpO2: 94% (23 Jan 2025 08:28) (91% - 96%)    Parameters below as of 23 Jan 2025 08:28  Patient On (Oxygen Delivery Method): room air        CONSTITUTIONAL: NAD, well-groomed  RESPIRATORY: Normal respiratory effort; no increased WOB  CARDIOVASCULAR: Regular rate and rhythm, 1+ LE edema  ABDOMEN: Nontender to palpation, normoactive bowel sounds  PSYCH: A+O x3; affect appropriate  NEUROLOGY: CN 2-12 are intact and symmetric; no gross sensory deficits;   SKIN: No rashes; no palpable lesions    LABS:                        11.5   6.08  )-----------( 58       ( 23 Jan 2025 05:22 )             36.7     01-23    145  |  108  |  42.2[H]  ----------------------------<  106[H]  3.8   |  26.0  |  1.08    Ca    8.5      23 Jan 2025 05:22  Phos  4.3     01-23  Mg     2.3     01-23    TPro  5.9[L]  /  Alb  3.4  /  TBili  0.7  /  DBili  0.2  /  AST  15  /  ALT  14  /  AlkPhos  52  01-23          Urinalysis Basic - ( 23 Jan 2025 05:22 )    Color: x / Appearance: x / SG: x / pH: x  Gluc: 106 mg/dL / Ketone: x  / Bili: x / Urobili: x   Blood: x / Protein: x / Nitrite: x   Leuk Esterase: x / RBC: x / WBC x   Sq Epi: x / Non Sq Epi: x / Bacteria: x        CAPILLARY BLOOD GLUCOSE          RADIOLOGY & ADDITIONAL TESTS:  Results Reviewed:   Imaging Personally Reviewed:  Electrocardiogram Personally Reviewed:
                                                         [facility]                                                         CARDIOLOGY AT [place] [address]                                                         Telephone: [phone]. Fax:[phone]                                                                             PROGRESS NOTE    Reason for follow up: HF  Last 24h Telemetry: AF  Overall Plan: ace wraps/compression stockings  changes IV diuretics to Torsemide PO  follow up in office next week       Review of symptoms:   Cardiac:  No chest pain. No dyspnea. No palpitations.  Respiratory: no cough. No dyspnea  Gastrointestinal: No diarrhea. No abdominal pain. No bleeding.   Neuro: No focal neuro complaints.      Vitals:  T(C): 36.3 (01-24-25 @ 08:26), Max: 36.7 (01-23-25 @ 12:00)  HR: 83 (01-24-25 @ 08:26) (74 - 83)  BP: 119/68 (01-24-25 @ 08:26) (112/73 - 132/62)  RR: 18 (01-24-25 @ 08:26) (16 - 18)  SpO2: 95% (01-24-25 @ 08:26) (91% - 96%)        I&O's Summary    23 Jan 2025 07:01  -  24 Jan 2025 07:00  --------------------------------------------------------  IN: 200 mL / OUT: 1560 mL / NET: -1360 mL      Weight (kg): 107 (01-21 @ 09:56)      PHYSICAL EXAM:  Appearance: Comfortable. No acute distress  HEENT:  Atraumatic. Normocephalic.  Normal oral mucosa  Neurologic: A & O x 3, no gross focal deficits.  Cardiovascular: RRR S1 S2, No murmur, no rubs/gallops. No JVD  Respiratory: Lungs clear to auscultation, unlabored   Gastrointestinal:  Soft, Non-tender, + BS  Lower Extremities: 2+ edema  Psychiatry: Patient is calm. No agitation.   Skin: warm and dry.      CURRENT CARDIAC MEDICATIONS:  aMIOdarone  Tablet 200 milliGRAM(s) Oral daily  carvedilol 25 milliGRAM(s) Oral every 12 hours  hydrALAZINE 50 milliGRAM(s) Oral two times a day  valsartan 320 milliGRAM(s) Oral daily      CURRENT OTHER MEDICATIONS:  zolpidem 5 milliGRAM(s) Oral at bedtime PRN Insomnia  atorvastatin 40 milliGRAM(s) Oral at bedtime  colchicine 0.6 milliGRAM(s) Oral two times a day  finasteride 5 milliGRAM(s) Oral daily  apixaban 5 milliGRAM(s) Oral every 12 hours  aspirin enteric coated 81 milliGRAM(s) Oral daily      LABS:	 	                        11.2   5.48  )-----------( 57       ( 24 Jan 2025 05:02 )             35.6     01-24    145  |  106  |  47.0[H]  ----------------------------<  93  3.5   |  25.0  |  1.06    Ca    8.5      24 Jan 2025 05:02  Phos  4.3     01-23  Mg     2.3     01-23    TPro  5.9[L]  /  Alb  3.4  /  TBili  0.7  /  DBili  0.2  /  AST  15  /  ALT  14  /  AlkPhos  52  01-23    PT/INR/PTT ( 21 Jan 2025 11:20 )                       :                       :      18.9         :       36.3                  .        .                   .              .           .       1.64        .                                         TSH: Thyroid Stimulating Hormone, Serum: 1.51 uIU/mL        DIAGNOSTIC TESTING:  [ ] Echocardiogram:   < from: TTE W or WO Ultrasound Enhancing Agent (01.21.25 @ 16:43) >  CONCLUSIONS:      1. Left ventricular cavity is normal in size. The interventricular septum is flattened in systole and diastole consistent with right ventricular pressure and volume overload. Left ventricular systolic function is hyperdynamic with an ejectionfraction visually estimated at >75 %.   2. Normal right ventricular cavity size and normal right ventricular systolic function.   3. Mild left ventricular hypertrophy.   4. Mild mitral regurgitation.   5. MitraClip visualized with a mean gradient of 6.75 mmHg through the mitral valve.   6. Mild to moderate tricuspid regurgitation.   7. Mild to moderate pulmonary hypertension.   8. Trileaflet aortic valve with normal systolic excursion.   9. Trace aortic regurgitation.  10. Large bilateral pleuraleffusion noted.  11. Moderate pericardial effusion with no echocardiographic evidence of tamponade physiology.    < end of copied text >      
                                                         [facility]                                                         CARDIOLOGY AT [facility]                                                                  [address] [place] [address]                                                         Telephone: [phone]. Fax:[phone]                                                                             PROGRESS NOTE    Reason for follow up: CHF and atrial fib  Update: Atrial fib is controlled  Feeling better  does not feel sob  To ambulate on RA      Review of symptoms:   Cardiac:  No chest pain. No dyspnea. No palpitations.  Respiratory: no cough. No dyspnea  Gastrointestinal: No diarrhea. No abdominal pain. No bleeding.   Neuro: No focal neuro complaints.    Vitals:  T(C): 36.2 (01-23-25 @ 08:28), Max: 36.6 (01-23-25 @ 04:04)  HR: 81 (01-23-25 @ 08:28) (73 - 93)  BP: 102/67 (01-23-25 @ 08:28) (80/40 - 145/82)  RR: 17 (01-23-25 @ 08:28) (17 - 19)  SpO2: 94% (01-23-25 @ 08:28) (91% - 96%)  Wt(kg): --  I&O's Summary    22 Jan 2025 07:01  -  23 Jan 2025 07:00  --------------------------------------------------------  IN: 600 mL / OUT: 300 mL / NET: 300 mL  Weight (kg): 107 (01-21 @ 09:56)      PHYSICAL EXAM:  Appearance: Comfortable. No acute distress  HEENT:  Atraumatic. Normocephalic.  Normal oral mucosa  Neurologic: A & O x 3, no gross focal deficits.  Cardiovascular: RRR S1 S2, 1/6 mendy lsb  Respiratory: Lungs clear to auscultation, unlabored   Gastrointestinal:  Soft, Non-tender, + BS  Lower Extremities: Decreased edema   Psychiatry: Patient is calm. No agitation.   Skin: warm and dry.    CURRENT MEDICATIONS:  MEDICATIONS  (STANDING):  aMIOdarone    Tablet 200 milliGRAM(s) Oral daily  apixaban 5 milliGRAM(s) Oral every 12 hours  aspirin enteric coated 81 milliGRAM(s) Oral daily  atorvastatin 40 milliGRAM(s) Oral at bedtime  carvedilol 25 milliGRAM(s) Oral every 12 hours  colchicine 0.6 milliGRAM(s) Oral two times a day  finasteride 5 milliGRAM(s) Oral daily  furosemide   Injectable 40 milliGRAM(s) IV Push every 12 hours  hydrALAZINE 50 milliGRAM(s) Oral two times a day  valsartan 320 milliGRAM(s) Oral daily    LABS:	 	                       11.5   6.08  )-----------( 58       ( 23 Jan 2025 05:22 )             36.7     01-23    145  |  108  |  42.2[H]  ----------------------------<  106[H]  3.8   |  26.0  |  1.08    Ca    8.5      23 Jan 2025 05:22  Phos  4.3     01-23  Mg     2.3     01-23    TPro  5.9[L]  /  Alb  3.4  /  TBili  0.7  /  DBili  0.2  /  AST  15  /  ALT  14  /  AlkPhos  52  01-23  TELEMETRY: NSR    DIAGNOSTIC TESTING:  [ ] Echocardiogram: < from: TTE W or WO Ultrasound Enhancing Agent (01.21.25 @ 16:43) >   1. Left ventricular cavity is normal in size. The interventricular septum is flattened in systole and diastole consistent with right ventricular pressure and volume overload. Left ventricular systolic function is hyperdynamic with an ejectionfraction visually estimated at >75 %.   2. Normal right ventricular cavity size and normal right ventricular systolic function.   3. Mild left ventricular hypertrophy.   4. Mild mitral regurgitation.   5. MitraClip visualized with a mean gradient of 6.75 mmHg through the mitral valve.   6. Mild to moderate tricuspid regurgitation.   7. Mild to moderate pulmonary hypertension.   8. Trileaflet aortic valve with normal systolic excursion.   9. Trace aortic regurgitation.  10. Large bilateral pleuraleffusion noted.  11. Moderate pericardial effusion with no echocardiographic evidence of tamponade physiology.

## 2025-01-24 NOTE — DISCHARGE NOTE NURSING/CASE MANAGEMENT/SOCIAL WORK - FINANCIAL ASSISTANCE
[facility] provides services at a reduced cost to those who are determined to be eligible through [facility]’s financial assistance program. Information regarding [facility]’s financial assistance program can be found by going to [email or web address] or by calling [phone].

## 2025-01-24 NOTE — PROGRESS NOTE ADULT - ASSESSMENT
85 y/o Male w/ PMH of HTN, HLF, HF, CAD s/p stents, severe MR s/p mitral clip, new onset Afib recently failed cardioversion now on amio/eliquis  cath 2/24  No evidence of AS  Mild systemic hypertension  widely patent epicardial coronary arteries including RCA, L main circ om and Lad presents with SOB for 1 week and found to be in pulm edema.  Denies food indiscretion and he is compliant with his meds  
This is a 83 y/o Male w/ PMH of HTN, HLF, HF, CAD s/p stents, severe MR s/p mitral clip, new onset Afib recently failed cardioversion now on amio/eliquis  cath 2/24  No evidence of AS  Mild systemic hypertension  widely patent epicardial coronary arteries including RCA, L main circ om and Lad presents with SOB for 1 week and found to be in pulm edema.  Denies food indiscretion and he is compliant with his meds      .
84 year old male with Hypertension, CAD s/p PCI, Atrial Fibrillation with recent unsuccessful cardioversion (Jan 13 in [place]), BPH, Mitral regurgitation s/p Clip and heart failure presents with dyspnea and admitted for acute heart failure exacerbation.     #Acute Hypoxic Respiratory Failure secondary to Acute Heart Failure, Systolic + Diastolic  #Valvular Heart Disease  Tele monitoring  Strict in and out  Daily weights  TTE reviewed with moderate pericardial effusion  Started on Colchicine - Monitor for side effects as multiple interaction (Rhabdo, pancytopenia, n/v/diarrhea)  C/w Lasix 40 IV BID  Cardio on board  C/w Coreg, valsartan, hctz    #Atrial Fibrillation  #History of CAD s/p PCI  #Hypertension  Resume home Valsartan, Carvedilol, Hydralazine for CHF  Amiodarone for rate control  Transition Apixaban to LMWH for now pending possible procedures  ASA, Statin  Cardio on board    #BPH  Finasteride      Disposition - Pending clinical course;     Discussed with patient at bedside
84 year old male with Hypertension, CAD s/p PCI, Atrial Fibrillation with recent unsuccessful cardioversion (Jan 13 in [place]), BPH, Mitral regurgitation s/p Clip and heart failure presents with dyspnea and admitted for acute heart failure exacerbation.     #Acute Hypoxic Respiratory Failure secondary to Acute Heart Failure, Systolic + Diastolic  #Valvular Heart Disease  Tele monitoring  Strict in and out  Daily weights  TTE reviewed with moderate pericardial effusion  Started on Colchicine - Monitor for side effects as multiple interaction (Rhabdo, pancytopenia, n/v/diarrhea)  C/w Lasix 40 IV BID, titration per cards  Cardio on board  C/w Coreg, valsartan, hctz  Seen by EP -- plan for likely outpatient ablation    #Atrial Fibrillation  #History of CAD s/p PCI  #Hypertension  Resume home Valsartan, Carvedilol, Hydralazine for CHF  Amiodarone for rate control  cw Eliquis   ASA, Statin  Cardio on board    #BPH  Finasteride    DVT ppx: Eliquis   Disposition - Pending cards clearance
83 y/o Male w/ PMH of HTN, HLF, HF, CAD s/p stents, severe MR s/p mitral clip, new onset Afib recently failed cardioversion now on amio/eliquis  cath 2/24  No evidence of AS  Mild systemic hypertension  widely patent epicardial coronary arteries including RCA, L main circ om and Lad presents with SOB for 1 week and found to be in pulm edema.  Denies food indiscretion and he is compliant with his meds

## 2025-01-24 NOTE — DISCHARGE NOTE NURSING/CASE MANAGEMENT/SOCIAL WORK - PATIENT PORTAL LINK FT
You can access the FollowMyHealth Patient Portal offered by [facility] by registering at the following website: [email or web address]. By joining [employer]’s FollowMyHealth portal, you will also be able to view your health information using other applications (apps) compatible with our system.

## 2025-01-24 NOTE — PROGRESS NOTE ADULT - REASON FOR ADMISSION
Shortness of breath  Heart Failure

## 2025-01-24 NOTE — DISCHARGE NOTE PROVIDER - CARE PROVIDER_API CALL
Gregor Umanzor  Cardiac Electrophysiology  [address] [place] [address]  Phone: [phone]  Fax: [phone]  Follow Up Time:     Sathya Hernandez  Cardiovascular Disease  [address] [facility], [address]  [place] [address]  Phone: [phone]  Fax: [phone]  Follow Up Time: 2 weeks

## 2025-01-24 NOTE — DISCHARGE NOTE PROVIDER - HOSPITAL COURSE
83 y/o Male w/ PMH of HTN, HLF, HF, CAD s/p stents, severe MR s/p mitral clip, new onset Afib recently failed cardioversion now on amio/eliquis  cath 2/24  No evidence of AS  Mild systemic hypertension  widely patent epicardial coronary arteries including RCA, L main circ om and Lad presents with SOB for 1 week and found to be in pulm edema. Admitted for acute on chronic diastolic congestive heart failure.   ·  Plan: .  - states feeling much better today  - LE edema B/L, ace wraps or compression stockings recommended  - chest CTA  - d/c lasix IV  - added torsemide 10mg PO daily   - cont coreg, valsartan  - follow up with Dr. Hernandez in office in 1-2 weeks.     Problem/Plan - 2:  ·  Problem: Pericardial effusion.   ·  Plan: .  - moderate  - clinically stable  - cont colchicine   - repeat echo in 1 month.     Problem/Plan - 3:  ·  Problem: Atrial fibrillation.   ·  Plan: .  - EP following  - cont apixaban, amiodarone  - follow up with Dr. Umanzor for PPM.   85 y/o Male w/ PMH of HTN, HLF, HF, CAD s/p stents, severe MR s/p mitral clip, new onset Afib recently failed cardioversion now on amio/eliquis  cath 2/24  No evidence of AS  Mild systemic hypertension  widely patent epicardial coronary arteries including RCA, L main circ om and Lad presents with SOB for 1 week and found to be in pulm edema. Admitted for acute on chronic diastolic congestive heart failure. Seen by cards, and sp IV Lasix changed to Torsemide PO. Course cb mod pericardial effusion, started on Colchicine and will need repeat TTE in 1 month. Also noted with Afib RVR, seen by EP, given failed DCCV in the past, will plan for outpatient ablation and PPM.   Now cleared by EP and cards for discharge.

## 2025-01-24 NOTE — PROGRESS NOTE ADULT - PROBLEM SELECTOR PLAN 2
.  - moderate  - clinically stable  - cont colchicine   - repeat echo in 1 month
Moderate etiology unclear  Was mild last echo  crp esr  Colchicine  0.6 mg bid
Was mild now moderate  On colchicine bid   repeat echo in one month

## 2025-01-24 NOTE — DISCHARGE NOTE PROVIDER - NPI NUMBER (FOR SYSADMIN USE ONLY) :
Pt declined to schedule Lumbar LASHAE.    Bharti MAZARIEGOS    [facility]    
[[id number]],[[id number]]

## 2025-01-24 NOTE — DISCHARGE NOTE NURSING/CASE MANAGEMENT/SOCIAL WORK - NSDCPEFALRISK_GEN_ALL_CORE
For information on Fall & Injury Prevention, visit: [email or web address] OR  [email or web address] OR  https://www.cdc.gov/steadi/patient.html

## 2025-01-24 NOTE — DISCHARGE NOTE PROVIDER - NSDCFUSCHEDAPPT_GEN_ALL_CORE_FT
[facility]  ELECTROPH [address] [facility]  Scheduled Appointment: 02/03/2025

## 2025-01-24 NOTE — DISCHARGE NOTE PROVIDER - NSDCMRMEDTOKEN_GEN_ALL_CORE_FT
amiodarone 200 mg oral tablet: 1 tab(s) orally once a day  apixaban 5 mg oral tablet: 1 tab(s) orally once a day  aspirin 81 mg oral tablet: orally once a day  atorvastatin 40 mg oral tablet: 1 tab(s) orally once a day  carvedilol 25 mg oral tablet: 1 tab(s) orally 2 times a day  colchicine 0.6 mg oral tablet: 1 tab(s) orally 2 times a day  Eligard 45 mg/6 months subcutaneous injection, extended release: 45 milligram(s) subcutaneously every 3 months  finasteride 5 mg oral tablet: 1 tab(s) orally once a day  hydrALAZINE 50 mg oral tablet: 1 tab(s) orally 2 times a day  PreserVision AREDS 2 oral capsule: 1 tab(s) orally 2 times a day  torsemide 10 mg oral tablet: 1 tab(s) orally once a day  valsartan 320 mg oral tablet: 1 tab(s) orally once a day  zolpidem 5 mg oral tablet: 1 tab(s) orally once a day (at bedtime)

## 2025-01-24 NOTE — PROGRESS NOTE ADULT - NS ATTEND AMEND GEN_ALL_CORE FT
Though the diagnosis of AF is only a few months old, his symptoms go back to July or earlier and he has likely been in AF since. his AF is severely remodeled at 5 cm, possibly d/t combination of severe MR (now S/P clip) and AF. He has also failed 2 DCCV attempts under full load amiodarone. In the lack of significant triggers (not identified despite our efforts), attempts for rhythm control are likely low yield. We discussed with patient and family options, including another DCCV attempt with then further maintenance efforts, or a pace and ablate approach. The prefer the latter.  We will therefore plan to D/C amiodarone and schedule PPM implantation followed by AVJ ablation after pericardial effusion has resolved.
85 y/o Male w/ PMH of HTN, HLF, HF, CAD s/p stents, severe MR s/p mitral clip, new onset Afib recently failed cardioversion now on amio/eliquis  cath 2/24  No evidence of AS  Mild systemic hypertension  widely patent epicardial coronary arteries including RCA, L main circ om and Lad presents with SOB for 1 week and found to be in pulm edema.  Denies food indiscretion and he is compliant with his meds.      Acute HFpEF, NYHA III, ACC C  moderate pericardial effusion  atrial fibrillation with controlled ventricular response    at dry weight 229  has ambulated multiple times in hallway without dyspnea  at baseline health    telemetry appropriate atrial fibrillation controlled response    torsemide on discharge  will establish in office with myself  CHF education, they are well versed.    we will follow peripherally
Patient seen and examined by me.    I have discussed my recommendation with the PA which are outlined above.  Will follow.
83 y/o Male w/ PMH of HTN, HLF, HF, CAD s/p stents, severe MR s/p mitral clip, new onset Afib recently failed cardioversion now on amio/eliquis  cath 2/24  No evidence of AS  Mild systemic hypertension  widely patent epicardial coronary arteries including RCA, L main circ om and Lad presents with SOB for 1 week and found to be in pulm edema.  Denies food indiscretion and he is compliant with his meds.    Acute HFpEF, NYHA III, ACC C  moderate pericardial effusion  atrial fibrillation with controlled ventricular response    son and wife at bedside  patient reports he has opted for Pacemaker with EP as an outpatient    he reports he subjectively feels much better and has ambulated but still has some exertional dyspnea  denies orthopnea    continue with diuresis  obtain standing weight, 230 lb is his dry weight  obtain bladder scan, patient reports incomplete bladder emptying, has history of prostate cancer    no need for cardiac catheterization at this time and I discussed reason and rationale, patient in agreement    keep for additional diuresis today.  c/w rest of cardiac meds.

## 2025-01-24 NOTE — PROGRESS NOTE ADULT - PROBLEM SELECTOR PLAN 1
.  - states feeling much better today  - LE edema B/L, ace wraps or compression stockings recommended  - chest CTA  - d/c lasix IV  - added torsemide 10mg PO daily   - cont coreg, valsartan  - follow up with Dr. Hernandez in office in 1-2 weeks.

## 2025-01-24 NOTE — DISCHARGE NOTE PROVIDER - ATTENDING DISCHARGE PHYSICAL EXAMINATION:
VITALS:   T(C): 36.3 (01-24-25 @ 08:26), Max: 36.7 (01-23-25 @ 12:00)  HR: 83 (01-24-25 @ 08:26) (74 - 83)  BP: 119/68 (01-24-25 @ 08:26) (112/73 - 132/62)  RR: 18 (01-24-25 @ 08:26) (16 - 18)  SpO2: 95% (01-24-25 @ 08:26) (91% - 96%)    GENERAL: NAD, lying in bed comfortably  HEAD:  Atraumatic, Normocephalic  CHEST/LUNG: Clear to auscultation bilaterally; no increased WOB  HEART: irregular irregualar, +LE edema noted, stable from yesterday  ABDOMEN: BSx4; Soft, nontender, nondistended  PSYCH: Normal affect, euthymic mood

## 2025-01-24 NOTE — DISCHARGE NOTE PROVIDER - NSDCCPCAREPLAN_GEN_ALL_CORE_FT
PRINCIPAL DISCHARGE DIAGNOSIS  Diagnosis: Acute on chronic diastolic congestive heart failure  Assessment and Plan of Treatment:       SECONDARY DISCHARGE DIAGNOSES  Diagnosis: Pericardial effusion  Assessment and Plan of Treatment:     Diagnosis: Atrial fibrillation  Assessment and Plan of Treatment:     Diagnosis: Acute hypoxic respiratory failure  Assessment and Plan of Treatment:      PRINCIPAL DISCHARGE DIAGNOSIS  Diagnosis: Acute on chronic diastolic congestive heart failure  Assessment and Plan of Treatment:       SECONDARY DISCHARGE DIAGNOSES  Diagnosis: Pericardial effusion  Assessment and Plan of Treatment: continue colchicine as perscribed, followup for repeat TTE within 1 month    Diagnosis: Atrial fibrillation  Assessment and Plan of Treatment: followup with cardiology for consideration of ablation of Afib.    Diagnosis: Acute hypoxic respiratory failure  Assessment and Plan of Treatment:

## 2025-01-25 ENCOUNTER — TRANSCRIPTION ENCOUNTER (OUTPATIENT)
Age: 85
End: 2025-01-25

## 2025-01-27 PROBLEM — I34.0 NONRHEUMATIC MITRAL (VALVE) INSUFFICIENCY: Chronic | Status: ACTIVE | Noted: 2025-01-21

## 2025-01-27 PROBLEM — I25.10 ATHEROSCLEROTIC HEART DISEASE OF NATIVE CORONARY ARTERY WITHOUT ANGINA PECTORIS: Chronic | Status: ACTIVE | Noted: 2025-01-21

## 2025-01-27 PROBLEM — N40.0 BENIGN PROSTATIC HYPERPLASIA WITHOUT LOWER URINARY TRACT SYMPTOMS: Chronic | Status: ACTIVE | Noted: 2025-01-21

## 2025-01-27 PROBLEM — I48.91 UNSPECIFIED ATRIAL FIBRILLATION: Chronic | Status: ACTIVE | Noted: 2025-01-21

## 2025-01-27 PROBLEM — I10 ESSENTIAL (PRIMARY) HYPERTENSION: Chronic | Status: ACTIVE | Noted: 2025-01-21

## 2025-01-31 ENCOUNTER — TRANSCRIPTION ENCOUNTER (OUTPATIENT)
Age: 85
End: 2025-01-31

## 2025-02-03 ENCOUNTER — APPOINTMENT (OUTPATIENT)
Dept: CARDIOLOGY | Facility: CLINIC | Age: 85
End: 2025-02-03
Payer: MEDICARE

## 2025-02-03 ENCOUNTER — NON-APPOINTMENT (OUTPATIENT)
Age: 85
End: 2025-02-03

## 2025-02-03 VITALS
OXYGEN SATURATION: 96 % | HEIGHT: 70 IN | SYSTOLIC BLOOD PRESSURE: 132 MMHG | WEIGHT: 222 LBS | HEART RATE: 86 BPM | BODY MASS INDEX: 31.78 KG/M2 | DIASTOLIC BLOOD PRESSURE: 78 MMHG

## 2025-02-03 DIAGNOSIS — I10 ESSENTIAL (PRIMARY) HYPERTENSION: ICD-10-CM

## 2025-02-03 PROCEDURE — 99214 OFFICE O/P EST MOD 30 MIN: CPT

## 2025-02-03 PROCEDURE — G2211 COMPLEX E/M VISIT ADD ON: CPT

## 2025-02-03 PROCEDURE — G0444 DEPRESSION SCREEN ANNUAL: CPT

## 2025-02-03 PROCEDURE — 93000 ELECTROCARDIOGRAM COMPLETE: CPT | Mod: XU

## 2025-02-03 RX ORDER — APIXABAN 5 MG/1
5 TABLET, FILM COATED ORAL
Refills: 0 | Status: ACTIVE | COMMUNITY

## 2025-02-03 RX ORDER — HYDRALAZINE HYDROCHLORIDE 50 MG/1
50 TABLET ORAL TWICE DAILY
Refills: 0 | Status: ACTIVE | COMMUNITY

## 2025-02-03 RX ORDER — ZOLPIDEM TARTRATE 5 MG/1
5 TABLET, FILM COATED ORAL
Refills: 0 | Status: ACTIVE | COMMUNITY

## 2025-02-03 RX ORDER — FINASTERIDE 5 MG/1
5 TABLET, FILM COATED ORAL DAILY
Refills: 0 | Status: ACTIVE | COMMUNITY

## 2025-02-03 RX ORDER — VALSARTAN 320 MG/1
320 TABLET, COATED ORAL DAILY
Refills: 0 | Status: ACTIVE | COMMUNITY

## 2025-02-03 RX ORDER — ATORVASTATIN CALCIUM 40 MG/1
40 TABLET, FILM COATED ORAL DAILY
Refills: 0 | Status: ACTIVE | COMMUNITY

## 2025-02-03 RX ORDER — CARVEDILOL 25 MG/1
25 TABLET, FILM COATED ORAL TWICE DAILY
Refills: 0 | Status: ACTIVE | COMMUNITY

## 2025-02-03 RX ORDER — AMIODARONE HYDROCHLORIDE 200 MG/1
200 TABLET ORAL DAILY
Refills: 0 | Status: ACTIVE | COMMUNITY

## 2025-02-03 RX ORDER — LEUPROLIDE ACETATE 45 MG/.375ML
INJECTION, SUSPENSION, EXTENDED RELEASE SUBCUTANEOUS
Refills: 0 | Status: ACTIVE | COMMUNITY

## 2025-02-04 RX ORDER — COLCHICINE 0.6 MG/1
0.6 TABLET ORAL DAILY
Refills: 0 | Status: DISCONTINUED | COMMUNITY
End: 2025-02-04

## 2025-02-06 ENCOUNTER — APPOINTMENT (OUTPATIENT)
Dept: CARDIOLOGY | Facility: CLINIC | Age: 85
End: 2025-02-06
Payer: MEDICARE

## 2025-02-06 ENCOUNTER — TRANSCRIPTION ENCOUNTER (OUTPATIENT)
Age: 85
End: 2025-02-06

## 2025-02-06 PROCEDURE — 93306 TTE W/DOPPLER COMPLETE: CPT

## 2025-02-07 ENCOUNTER — TRANSCRIPTION ENCOUNTER (OUTPATIENT)
Age: 85
End: 2025-02-07

## 2025-02-08 DIAGNOSIS — I48.19 OTHER PERSISTENT ATRIAL FIBRILLATION: ICD-10-CM

## 2025-02-08 DIAGNOSIS — I50.30 UNSPECIFIED DIASTOLIC (CONGESTIVE) HEART FAILURE: ICD-10-CM

## 2025-02-08 DIAGNOSIS — Z98.890 OTHER SPECIFIED POSTPROCEDURAL STATES: ICD-10-CM

## 2025-02-08 DIAGNOSIS — Z95.818 OTHER SPECIFIED POSTPROCEDURAL STATES: ICD-10-CM

## 2025-02-08 DIAGNOSIS — I07.1 RHEUMATIC TRICUSPID INSUFFICIENCY: ICD-10-CM

## 2025-02-08 PROBLEM — I31.39 PERICARDIAL EFFUSION: Status: ACTIVE | Noted: 2025-02-03

## 2025-02-10 ENCOUNTER — APPOINTMENT (OUTPATIENT)
Dept: ELECTROPHYSIOLOGY | Facility: CLINIC | Age: 85
End: 2025-02-10
Payer: MEDICARE

## 2025-02-10 VITALS
SYSTOLIC BLOOD PRESSURE: 102 MMHG | BODY MASS INDEX: 31.78 KG/M2 | HEART RATE: 71 BPM | HEIGHT: 70 IN | DIASTOLIC BLOOD PRESSURE: 68 MMHG | OXYGEN SATURATION: 99 % | WEIGHT: 222 LBS

## 2025-02-10 PROCEDURE — 99214 OFFICE O/P EST MOD 30 MIN: CPT

## 2025-02-10 PROCEDURE — 93000 ELECTROCARDIOGRAM COMPLETE: CPT

## 2025-02-10 RX ORDER — COLCHICINE 0.6 MG/1
0.6 TABLET ORAL TWICE DAILY
Qty: 60 | Refills: 1 | Status: DISCONTINUED | COMMUNITY
Start: 2025-02-03 | End: 2025-02-10

## 2025-02-10 RX ORDER — COLCHICINE 0.6 MG/1
0.6 TABLET ORAL
Qty: 60 | Refills: 0 | Status: ACTIVE | COMMUNITY
Start: 2025-02-10 | End: 1900-01-01

## 2025-02-11 ENCOUNTER — NON-APPOINTMENT (OUTPATIENT)
Age: 85
End: 2025-02-11

## 2025-02-13 DIAGNOSIS — I31.39 OTHER PERICARDIAL EFFUSION (NONINFLAMMATORY): ICD-10-CM

## 2025-02-13 RX ORDER — TORSEMIDE 10 MG/1
10 TABLET ORAL DAILY
Qty: 90 | Refills: 3 | Status: ACTIVE | COMMUNITY
Start: 1900-01-01 | End: 1900-01-01

## 2025-02-24 ENCOUNTER — TRANSCRIPTION ENCOUNTER (OUTPATIENT)
Age: 85
End: 2025-02-24

## 2025-02-24 ENCOUNTER — APPOINTMENT (OUTPATIENT)
Dept: CARDIOLOGY | Facility: CLINIC | Age: 85
End: 2025-02-24
Payer: MEDICARE

## 2025-02-24 DIAGNOSIS — I50.30 UNSPECIFIED DIASTOLIC (CONGESTIVE) HEART FAILURE: ICD-10-CM

## 2025-02-24 PROCEDURE — 93308 TTE F-UP OR LMTD: CPT

## 2025-02-24 PROCEDURE — 93325 DOPPLER ECHO COLOR FLOW MAPG: CPT

## 2025-02-24 PROCEDURE — 93321 DOPPLER ECHO F-UP/LMTD STD: CPT

## 2025-02-24 RX ORDER — TORSEMIDE 20 MG/1
20 TABLET ORAL
Qty: 10 | Refills: 0 | Status: ACTIVE | COMMUNITY
Start: 2025-02-24 | End: 1900-01-01

## 2025-02-25 LAB
ANION GAP SERPL CALC-SCNC: 14 MMOL/L
BUN SERPL-MCNC: 29 MG/DL
CALCIUM SERPL-MCNC: 8.8 MG/DL
CHLORIDE SERPL-SCNC: 103 MMOL/L
CO2 SERPL-SCNC: 27 MMOL/L
CREAT SERPL-MCNC: 1.25 MG/DL
EGFR: 57 ML/MIN/1.73M2
GLUCOSE SERPL-MCNC: 112 MG/DL
POTASSIUM SERPL-SCNC: 3.6 MMOL/L
SODIUM SERPL-SCNC: 145 MMOL/L

## 2025-03-05 NOTE — ED PROVIDER NOTE - CONSTITUTIONAL NEGATIVE STATEMENT, MLM
Initiate Treatment: Cerave moisturizing lotion\\nAveeno skin relief body wash Discontinue Regimen: Avoid dial, lever, zest, Amharic spring and ivory no fever and no chills. Detail Level: Zone

## 2025-03-11 ENCOUNTER — APPOINTMENT (OUTPATIENT)
Dept: CARDIOLOGY | Facility: CLINIC | Age: 85
End: 2025-03-11
Payer: MEDICARE

## 2025-03-11 VITALS
DIASTOLIC BLOOD PRESSURE: 60 MMHG | BODY MASS INDEX: 33.21 KG/M2 | SYSTOLIC BLOOD PRESSURE: 122 MMHG | HEART RATE: 84 BPM | OXYGEN SATURATION: 95 % | HEIGHT: 70 IN | WEIGHT: 232 LBS

## 2025-03-11 DIAGNOSIS — I31.39 OTHER PERICARDIAL EFFUSION (NONINFLAMMATORY): ICD-10-CM

## 2025-03-11 DIAGNOSIS — I48.19 OTHER PERSISTENT ATRIAL FIBRILLATION: ICD-10-CM

## 2025-03-11 DIAGNOSIS — R60.0 LOCALIZED EDEMA: ICD-10-CM

## 2025-03-11 DIAGNOSIS — E78.5 HYPERLIPIDEMIA, UNSPECIFIED: ICD-10-CM

## 2025-03-11 PROCEDURE — 93000 ELECTROCARDIOGRAM COMPLETE: CPT

## 2025-03-11 PROCEDURE — 99204 OFFICE O/P NEW MOD 45 MIN: CPT

## 2025-03-11 RX ORDER — TORSEMIDE 10 MG/1
10 TABLET ORAL DAILY
Qty: 90 | Refills: 0 | Status: ACTIVE | COMMUNITY
Start: 2025-03-11 | End: 1900-01-01

## 2025-03-13 DIAGNOSIS — I50.30 UNSPECIFIED DIASTOLIC (CONGESTIVE) HEART FAILURE: ICD-10-CM

## 2025-03-14 ENCOUNTER — APPOINTMENT (OUTPATIENT)
Dept: MRI IMAGING | Facility: CLINIC | Age: 85
End: 2025-03-14

## 2025-03-14 ENCOUNTER — RESULT REVIEW (OUTPATIENT)
Age: 85
End: 2025-03-14

## 2025-03-14 ENCOUNTER — OUTPATIENT (OUTPATIENT)
Dept: OUTPATIENT SERVICES | Facility: HOSPITAL | Age: 85
LOS: 1 days | End: 2025-03-14
Payer: MEDICARE

## 2025-03-14 DIAGNOSIS — I50.30 UNSPECIFIED DIASTOLIC (CONGESTIVE) HEART FAILURE: ICD-10-CM

## 2025-03-14 DIAGNOSIS — Z98.890 OTHER SPECIFIED POSTPROCEDURAL STATES: Chronic | ICD-10-CM

## 2025-03-14 PROCEDURE — 75565 CARD MRI VELOC FLOW MAPPING: CPT | Mod: 26

## 2025-03-14 PROCEDURE — 75561 CARDIAC MRI FOR MORPH W/DYE: CPT | Mod: 26

## 2025-03-14 PROCEDURE — A9585: CPT

## 2025-03-14 PROCEDURE — 75561 CARDIAC MRI FOR MORPH W/DYE: CPT

## 2025-03-14 PROCEDURE — 75565 CARD MRI VELOC FLOW MAPPING: CPT | Mod: MH

## 2025-03-15 NOTE — H&P ADULT - NSHPSOCIALHISTORY_GEN_ALL_CORE
Patient with history of hypokalemia. Initial K 2.2 in ED. Patient's most recent potassium results are listed below.   Recent Labs     03/14/25  0155 03/14/25  0528 03/14/25  0908   K 2.2* 2.9* 2.9*     Plan  - Replete potassium per protocol  - Monitor potassium   - EKG appears stable   , 2 children currently lives with wife in [facility], retired [place]   Denies any smoking, alcohol or drug use

## 2025-03-18 ENCOUNTER — TRANSCRIPTION ENCOUNTER (OUTPATIENT)
Age: 85
End: 2025-03-18

## 2025-03-24 ENCOUNTER — APPOINTMENT (OUTPATIENT)
Dept: ELECTROPHYSIOLOGY | Facility: CLINIC | Age: 85
End: 2025-03-24
Payer: MEDICARE

## 2025-03-24 VITALS
SYSTOLIC BLOOD PRESSURE: 110 MMHG | DIASTOLIC BLOOD PRESSURE: 68 MMHG | OXYGEN SATURATION: 92 % | WEIGHT: 232 LBS | HEART RATE: 84 BPM | BODY MASS INDEX: 33.21 KG/M2 | HEIGHT: 70 IN

## 2025-03-24 PROCEDURE — 99214 OFFICE O/P EST MOD 30 MIN: CPT

## 2025-03-24 PROCEDURE — 93242 EXT ECG>48HR<7D RECORDING: CPT

## 2025-03-24 PROCEDURE — 93000 ELECTROCARDIOGRAM COMPLETE: CPT | Mod: 59

## 2025-03-25 ENCOUNTER — RESULT REVIEW (OUTPATIENT)
Age: 85
End: 2025-03-25

## 2025-03-25 ENCOUNTER — APPOINTMENT (OUTPATIENT)
Dept: CARDIOLOGY | Facility: CLINIC | Age: 85
End: 2025-03-25

## 2025-03-25 ENCOUNTER — NON-APPOINTMENT (OUTPATIENT)
Age: 85
End: 2025-03-25

## 2025-03-25 VITALS
DIASTOLIC BLOOD PRESSURE: 64 MMHG | HEIGHT: 70 IN | OXYGEN SATURATION: 96 % | WEIGHT: 222 LBS | BODY MASS INDEX: 31.78 KG/M2 | SYSTOLIC BLOOD PRESSURE: 116 MMHG | HEART RATE: 76 BPM

## 2025-03-31 PROCEDURE — 93242 EXT ECG>48HR<7D RECORDING: CPT

## 2025-05-01 PROBLEM — Z00.00 ENCOUNTER FOR PREVENTIVE HEALTH EXAMINATION: Status: ACTIVE | Noted: 2025-05-01

## 2025-05-02 ENCOUNTER — APPOINTMENT (OUTPATIENT)
Dept: CARDIOLOGY | Facility: CLINIC | Age: 85
End: 2025-05-02

## 2025-05-05 PROCEDURE — 93244 EXT ECG>48HR<7D REV&INTERPJ: CPT
